# Patient Record
Sex: MALE | Race: WHITE | NOT HISPANIC OR LATINO | Employment: FULL TIME | URBAN - METROPOLITAN AREA
[De-identification: names, ages, dates, MRNs, and addresses within clinical notes are randomized per-mention and may not be internally consistent; named-entity substitution may affect disease eponyms.]

---

## 2017-07-25 ENCOUNTER — OFFICE VISIT (OUTPATIENT)
Dept: MEDICAL GROUP | Facility: MEDICAL CENTER | Age: 37
End: 2017-07-25
Payer: COMMERCIAL

## 2017-07-25 VITALS
BODY MASS INDEX: 43.4 KG/M2 | HEART RATE: 63 BPM | RESPIRATION RATE: 20 BRPM | OXYGEN SATURATION: 96 % | TEMPERATURE: 98.1 F | WEIGHT: 310 LBS | SYSTOLIC BLOOD PRESSURE: 128 MMHG | HEIGHT: 71 IN | DIASTOLIC BLOOD PRESSURE: 82 MMHG

## 2017-07-25 DIAGNOSIS — E66.01 MORBID OBESITY WITH BMI OF 40.0-44.9, ADULT (HCC): ICD-10-CM

## 2017-07-25 DIAGNOSIS — Z00.00 ENCOUNTER FOR ANNUAL PHYSICAL EXAM: ICD-10-CM

## 2017-07-25 PROCEDURE — 99395 PREV VISIT EST AGE 18-39: CPT | Performed by: PHYSICIAN ASSISTANT

## 2017-07-25 ASSESSMENT — PATIENT HEALTH QUESTIONNAIRE - PHQ9: CLINICAL INTERPRETATION OF PHQ2 SCORE: 0

## 2017-07-25 NOTE — PROGRESS NOTES
Moris Moffett is a 36 y.o. new male here for establishing care and annual exam.   HPI:    Patient is generally healthy without any questions or health concerns. He knows he has weight problems and his weight has been going up and down throughout the years. States he has been successful losing weight on his own without help off medication in the past. Admits to having about diet and not exercising.    Current medicines (including changes today)  No current outpatient prescriptions on file.     No current facility-administered medications for this visit.     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 He also has no past medical history of Congestive heart failure (CMS-HCC), Diabetes, Psychiatric disorder, Seizure disorder (CMS-HCC), Hypertension, ASTHMA, Cancer (CMS-HCC), Renal disorder, CAD (coronary artery disease), Liver disease, Stroke (CMS-HCC), or COPD.  He  has no past surgical history on file.  Social History   Substance Use Topics   • Smoking status: Never Smoker    • Smokeless tobacco: Never Used   • Alcohol Use: 0.0 oz/week     0 Standard drinks or equivalent per week      Comment: Very rarely     Social History     Social History Narrative     Family History   Problem Relation Age of Onset   • Other Mother      Obesity   • Other Father      Obesity   • Cancer Mother      Breast   • Cancer Maternal Grandmother      Breast   • Cancer Maternal Aunt      Breast   • Diabetes Paternal Grandmother    • Hypertension Mother      Family Status   Relation Status Death Age   • Mother Alive    • Father Alive        Past medical, surgical, family, and social history is reviewed in [facility] chart by me today.   Medications and allergies reviewed in Epic chart by me today.       ROS  General:  No fevers or chills, no  "night sweats or fatigue.   Eyes: no change in vision.   ENT:         Ears: No drainage. No change in hearing      Nose :No epitaxis        Mouth/ throat: No lesions. No sore throat  Resp: No difficulty breathing. No cough, wheezing.  CV: no SOB, no palpitation, no chest pain, no edema  GI: no nausea, no vomiting, no diarrhea or constipations. Bowel regular and normal. No melena or hematochezia. No hematemesis  : no Frequency, no urgency, no dysuria, no hematuria.   MS:  Negative for muscle pain or weakness.  Skin: no rashes or abnormal lesions.   Neuro: No seizures, no tingling or numbness.   Endo: no polyuria, no polydypsia, no cold intolerance, no heat intolerance  Psych: no depression, no anxiety, no Drug/Alcohol abuse  Hem: no easy bruising.    As documented in history of present illness  ROS neg:  All other review of systems were reviewed and negative.             Objective:     Blood pressure 128/82, pulse 63, temperature 36.7 °C (98.1 °F), resp. rate 20, height 1.803 m (5' 10.98\"), weight 140.615 kg (310 lb), SpO2 96 %. Body mass index is 43.26 kg/(m^2).  Physical Exam:    Constitutional: Well-developed and well-nourished. No distress.   Skin: Skin is warm and dry. No rashes in visible areas.  Nail: w/o pitting, ridging or onychomycosis   Head: Atraumatic without lesions.  Eyes: Equal, round and reactive, conjunctiva clear,sclera non icteric, lids normal.  Ears:  External ears unremarkable. Tympanic membranes clear and intact.  Nose: Nares patent. Septum midline. Turbinates without erythema nor edema. No discharge.    Mouth/Throat: Dentition is good. Tongue normal. Oropharynx is clear and moist. Posterior pharynx without erythema or exudates.  Neck: Supple, trachea midline.  No thyromegaly present. No lymphadenopathy--cervical or supraclavicular  Cardiovascular: Regular rate and rhythm, without any murmurs.  No lower extremity edema. Cap refill < 3sec  Lung:  Clear to auscultation throughout w/o any wheeze " or rhonchi. No adventitious sounds.    Abdomen: Soft, non tender, and without distention. Active bowel sounds in all four quadrants. No rebound, guarding, masses or HSM. No CVA tenderness  Musculoskeletal: All major joints without pain or swelling  Neurological: speech normal, mental status intact, gait grossly normal  Psychiatric:   Alert and oriented x3, normal affect and mood and behavior     Assessment and Plan:   The following treatment plan was discussed    1. Morbid obesity with BMI of 40.0-44.9, adult (CMS-[facility])  - Patient identified as having weight management issue.  Appropriate orders and counseling given.    2. Encounter for annual physical exam    - CBC WITH DIFFERENTIAL; Future  - COMP METABOLIC PANEL; Future  - LIPID PROFILE; Future  - TSH WITH REFLEX TO FT4; Future  - VITAMIN D,25 HYDROXY; Future      Followup: Return if symptoms worsen or fail to improve.         Please note that this dictation was created using voice recognition software. I have made every reasonable attempt to correct obvious errors, but I expect that there are errors of grammar and possibly content that I did not discover before finalizing the note

## 2017-07-26 ENCOUNTER — HOSPITAL ENCOUNTER (OUTPATIENT)
Dept: LAB | Facility: MEDICAL CENTER | Age: 37
End: 2017-07-26
Attending: PHYSICIAN ASSISTANT
Payer: COMMERCIAL

## 2017-07-26 DIAGNOSIS — Z00.00 ENCOUNTER FOR ANNUAL PHYSICAL EXAM: ICD-10-CM

## 2017-07-26 LAB
25(OH)D3 SERPL-MCNC: 14 NG/ML (ref 30–100)
BASOPHILS # BLD AUTO: 0.6 % (ref 0–1.8)
BASOPHILS # BLD: 0.04 K/UL (ref 0–0.12)
EOSINOPHIL # BLD AUTO: 0.11 K/UL (ref 0–0.51)
EOSINOPHIL NFR BLD: 1.5 % (ref 0–6.9)
ERYTHROCYTE [DISTWIDTH] IN BLOOD BY AUTOMATED COUNT: 40.5 FL (ref 35.9–50)
HCT VFR BLD AUTO: 45.8 % (ref 42–52)
HGB BLD-MCNC: 15.8 G/DL (ref 14–18)
IMM GRANULOCYTES # BLD AUTO: 0.02 K/UL (ref 0–0.11)
IMM GRANULOCYTES NFR BLD AUTO: 0.3 % (ref 0–0.9)
LYMPHOCYTES # BLD AUTO: 2.21 K/UL (ref 1–4.8)
LYMPHOCYTES NFR BLD: 30.7 % (ref 22–41)
MCH RBC QN AUTO: 30.5 PG (ref 27–33)
MCHC RBC AUTO-ENTMCNC: 34.5 G/DL (ref 33.7–35.3)
MCV RBC AUTO: 88.4 FL (ref 81.4–97.8)
MONOCYTES # BLD AUTO: 0.47 K/UL (ref 0–0.85)
MONOCYTES NFR BLD AUTO: 6.5 % (ref 0–13.4)
NEUTROPHILS # BLD AUTO: 4.35 K/UL (ref 1.82–7.42)
NEUTROPHILS NFR BLD: 60.4 % (ref 44–72)
NRBC # BLD AUTO: 0 K/UL
NRBC BLD AUTO-RTO: 0 /100 WBC
PLATELET # BLD AUTO: 249 K/UL (ref 164–446)
PMV BLD AUTO: 10.5 FL (ref 9–12.9)
RBC # BLD AUTO: 5.18 M/UL (ref 4.7–6.1)
TSH SERPL DL<=0.005 MIU/L-ACNC: 2.87 UIU/ML (ref 0.3–3.7)
WBC # BLD AUTO: 7.2 K/UL (ref 4.8–10.8)

## 2017-07-26 PROCEDURE — 85025 COMPLETE CBC W/AUTO DIFF WBC: CPT

## 2017-07-26 PROCEDURE — 36415 COLL VENOUS BLD VENIPUNCTURE: CPT

## 2017-07-26 PROCEDURE — 82306 VITAMIN D 25 HYDROXY: CPT

## 2017-07-26 PROCEDURE — 80053 COMPREHEN METABOLIC PANEL: CPT

## 2017-07-26 PROCEDURE — 84443 ASSAY THYROID STIM HORMONE: CPT

## 2017-07-26 PROCEDURE — 80061 LIPID PANEL: CPT

## 2017-07-27 LAB
ALBUMIN SERPL BCP-MCNC: 3.9 G/DL (ref 3.2–4.9)
ALBUMIN/GLOB SERPL: 1.3 G/DL
ALP SERPL-CCNC: 63 U/L (ref 30–99)
ALT SERPL-CCNC: 40 U/L (ref 2–50)
ANION GAP SERPL CALC-SCNC: 6 MMOL/L (ref 0–11.9)
AST SERPL-CCNC: 25 U/L (ref 12–45)
BILIRUB SERPL-MCNC: 0.7 MG/DL (ref 0.1–1.5)
BUN SERPL-MCNC: 13 MG/DL (ref 8–22)
CALCIUM SERPL-MCNC: 9.3 MG/DL (ref 8.5–10.5)
CHLORIDE SERPL-SCNC: 106 MMOL/L (ref 96–112)
CHOLEST SERPL-MCNC: 157 MG/DL (ref 100–199)
CO2 SERPL-SCNC: 26 MMOL/L (ref 20–33)
CREAT SERPL-MCNC: 0.86 MG/DL (ref 0.5–1.4)
GFR SERPL CREATININE-BSD FRML MDRD: >60 ML/MIN/1.73 M 2
GLOBULIN SER CALC-MCNC: 3 G/DL (ref 1.9–3.5)
GLUCOSE SERPL-MCNC: 78 MG/DL (ref 65–99)
HDLC SERPL-MCNC: 46 MG/DL
LDLC SERPL CALC-MCNC: 86 MG/DL
POTASSIUM SERPL-SCNC: 4.1 MMOL/L (ref 3.6–5.5)
PROT SERPL-MCNC: 6.9 G/DL (ref 6–8.2)
SODIUM SERPL-SCNC: 138 MMOL/L (ref 135–145)
TRIGL SERPL-MCNC: 126 MG/DL (ref 0–149)

## 2017-12-14 ENCOUNTER — OFFICE VISIT (OUTPATIENT)
Dept: MEDICAL GROUP | Facility: MEDICAL CENTER | Age: 37
End: 2017-12-14
Payer: COMMERCIAL

## 2017-12-14 VITALS
HEIGHT: 71 IN | RESPIRATION RATE: 16 BRPM | WEIGHT: 312.8 LBS | TEMPERATURE: 98.8 F | OXYGEN SATURATION: 93 % | DIASTOLIC BLOOD PRESSURE: 88 MMHG | HEART RATE: 94 BPM | SYSTOLIC BLOOD PRESSURE: 114 MMHG | BODY MASS INDEX: 43.79 KG/M2

## 2017-12-14 DIAGNOSIS — J06.9 ACUTE URI: ICD-10-CM

## 2017-12-14 LAB
FLUAV+FLUBV AG SPEC QL IA: NEGATIVE
INT CON NEG: NEGATIVE
INT CON POS: POSITIVE

## 2017-12-14 PROCEDURE — 87804 INFLUENZA ASSAY W/OPTIC: CPT | Performed by: PHYSICIAN ASSISTANT

## 2017-12-14 PROCEDURE — 99214 OFFICE O/P EST MOD 30 MIN: CPT | Performed by: PHYSICIAN ASSISTANT

## 2017-12-14 RX ORDER — IBUPROFEN 200 MG
200 TABLET ORAL EVERY 6 HOURS PRN
COMMUNITY
End: 2018-06-20

## 2017-12-14 RX ORDER — ACETAMINOPHEN 325 MG/1
650 TABLET ORAL EVERY 4 HOURS PRN
COMMUNITY
End: 2018-06-20

## 2017-12-14 RX ORDER — PROMETHAZINE HYDROCHLORIDE AND CODEINE PHOSPHATE 6.25; 1 MG/5ML; MG/5ML
5 SYRUP ORAL
Qty: 160 ML | Refills: 0 | Status: SHIPPED | OUTPATIENT
Start: 2017-12-14 | End: 2018-01-03

## 2017-12-14 NOTE — PROGRESS NOTES
Subjective:   Moris Moffett is a 37 y.o. male here today for possible influenza since Saturday which is 5 days.    Acute URI  This is a 37-year-old male accompanied by his wife. Since Saturday which is approximately 5 days he's had fevers with sinus congestion drainage and coughing. Last fever was last night at 101. He's been taking ibuprofen 600 mg as needed. Taking some cough syrup. It's an over-the-counter product. Last week returned from [place] on a trip. No known sick contacts at home. He denies any current chest pain or shortness of breath. Coughing was a problem last night. He has no comorbidities. Takes no chronic medications. Has 2 young boys at home and they are concerned about possible influenza.       Current medicines (including changes today)  Current Outpatient Prescriptions   Medication Sig Dispense Refill   • acetaminophen (TYLENOL) 325 MG Tab Take 650 mg by mouth every four hours as needed.     • promethazine-codeine (PHENERGAN-CODEINE) 6.25-10 MG/5ML Syrup Take 5 mL by mouth every bedtime for 20 days. 160 mL 0   • ibuprofen (MOTRIN) 200 MG Tab Take 200 mg by mouth every 6 hours as needed.       No current facility-administered medications for this visit.      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 He also has no past medical history of ASTHMA; CAD (coronary artery disease); Cancer (CMS-[facility]); Congestive heart failure (CMS-[facility]); COPD; Diabetes; Hypertension; Liver disease; Psychiatric disorder; Renal disorder; Seizure disorder (CMS-[facility]); or Stroke (CMS-[facility]).    ROS   No chest pain, no shortness of breath, no abdominal pain and all other systems were reviewed and are negative.       Objective:     Blood pressure 114/88, pulse 94, temperature 37.1 °C (98.8 °F), resp. rate 16, height 1.803  "m (5' 10.98\"), weight (!) 141.9 kg (312 lb 12.8 oz), SpO2 93 %. Body mass index is 43.65 kg/m².   Physical Exam:  Constitutional: Alert, no distress.  Skin: Warm, dry, good turgor, no rashes in visible areas.  Eye: Equal, round and reactive, conjunctiva clear, lids normal.  ENMT: Lips without lesions, good dentition, oropharynx clear.  Neck: Trachea midline, no masses.   Lymph: No cervical or supraclavicular lymphadenopathy  Respiratory: Unlabored respiratory effort, lungs clear to auscultation, no wheezes, no ronchi.  Cardiovascular: Normal S1, S2, no murmur, no edema.  Abdomen: Soft, non-tender, no masses.  Psych: Alert and oriented x3, normal affect and mood.    Influenza A and B rapid testing was negative.    Assessment and Plan:   The following treatment plan was discussed    1. Acute URI  Acute, new onset condition. Discussed with viral not bacterial cause. No fever today. We'll continue take ibuprofen 600 mg as needed for fevers. May take it every 6 hours. May alternate with Tylenol. Push fluids. Advised may continue over-the-counter cold medications such as DayQuil or NyQuil severe. Follow-up if any worsening symptoms such as fevers, shortness of breath or chest pain. Also advised I provided promethazine codeine to take at nighttime if needed. Do not take with other cold medications. May cause drowsiness. Do not drink or drive. May take it one hour prior to bedtime.  - POCT Influenza A/B  - promethazine-codeine (PHENERGAN-CODEINE) 6.25-10 MG/5ML Syrup; Take 5 mL by mouth every bedtime for 20 days.  Dispense: 160 mL; Refill: 0      Followup: No Follow-up on file.    Please note that this dictation was created using voice recognition software. I have made every reasonable attempt to correct obvious errors, but I expect that there are errors of grammar and possibly content that I did not discover before finalizing the note.           "

## 2017-12-14 NOTE — ASSESSMENT & PLAN NOTE
This is a 37-year-old male accompanied by his wife. Since Saturday which is approximately 5 days he's had fevers with sinus congestion drainage and coughing. Last fever was last night at 101. He's been taking ibuprofen 600 mg as needed. Taking some cough syrup. It's an over-the-counter product. Last week returned from [place] on a trip. No known sick contacts at home. He denies any current chest pain or shortness of breath. Coughing was a problem last night. He has no comorbidities. Takes no chronic medications. Has 2 young boys at home and they are concerned about possible influenza.

## 2017-12-14 NOTE — LETTER
December 14, 2017         Patient: Moris Moffett   YOB: 1980   Date of Visit: 12/14/2017           To Whom it May Concern:    Moris Moffett was seen in my clinic on 12/14/2017. He may return to work on 12/15/17.    If you have any questions or concerns, please don't hesitate to call.        Sincerely,           Tyson Marsh P.A.-C.  Electronically Signed

## 2018-06-20 ENCOUNTER — OFFICE VISIT (OUTPATIENT)
Dept: MEDICAL GROUP | Facility: MEDICAL CENTER | Age: 38
End: 2018-06-20
Payer: COMMERCIAL

## 2018-06-20 ENCOUNTER — HOSPITAL ENCOUNTER (OUTPATIENT)
Dept: RADIOLOGY | Facility: MEDICAL CENTER | Age: 38
End: 2018-06-20
Attending: FAMILY MEDICINE
Payer: COMMERCIAL

## 2018-06-20 ENCOUNTER — HOSPITAL ENCOUNTER (OUTPATIENT)
Dept: LAB | Facility: MEDICAL CENTER | Age: 38
End: 2018-06-20
Attending: FAMILY MEDICINE
Payer: COMMERCIAL

## 2018-06-20 VITALS
RESPIRATION RATE: 20 BRPM | TEMPERATURE: 98.2 F | HEART RATE: 58 BPM | WEIGHT: 300 LBS | SYSTOLIC BLOOD PRESSURE: 132 MMHG | OXYGEN SATURATION: 94 % | BODY MASS INDEX: 42 KG/M2 | DIASTOLIC BLOOD PRESSURE: 86 MMHG | HEIGHT: 71 IN

## 2018-06-20 DIAGNOSIS — R20.2 NUMBNESS AND TINGLING IN LEFT ARM: ICD-10-CM

## 2018-06-20 DIAGNOSIS — R20.0 NUMBNESS OF FOOT: ICD-10-CM

## 2018-06-20 DIAGNOSIS — R05.9 COUGH: ICD-10-CM

## 2018-06-20 DIAGNOSIS — R10.11 RIGHT UPPER QUADRANT PAIN: ICD-10-CM

## 2018-06-20 DIAGNOSIS — M54.2 NECK DISCOMFORT: ICD-10-CM

## 2018-06-20 DIAGNOSIS — R20.0 NUMBNESS AND TINGLING IN LEFT ARM: ICD-10-CM

## 2018-06-20 DIAGNOSIS — E66.01 MORBID OBESITY WITH BMI OF 40.0-44.9, ADULT (HCC): ICD-10-CM

## 2018-06-20 DIAGNOSIS — R19.5 CHANGE IN STOOL: ICD-10-CM

## 2018-06-20 DIAGNOSIS — M54.50 ACUTE RIGHT-SIDED LOW BACK PAIN WITHOUT SCIATICA: ICD-10-CM

## 2018-06-20 DIAGNOSIS — R07.89 OTHER CHEST PAIN: ICD-10-CM

## 2018-06-20 PROBLEM — Z00.00 HEALTHCARE MAINTENANCE: Status: RESOLVED | Noted: 2018-06-20 | Resolved: 2018-06-20

## 2018-06-20 PROBLEM — J06.9 ACUTE URI: Status: RESOLVED | Noted: 2017-12-14 | Resolved: 2018-06-20

## 2018-06-20 PROBLEM — Z00.00 HEALTHCARE MAINTENANCE: Status: ACTIVE | Noted: 2018-06-20

## 2018-06-20 LAB
ALBUMIN SERPL BCP-MCNC: 4.3 G/DL (ref 3.2–4.9)
ALBUMIN/GLOB SERPL: 1.7 G/DL
ALP SERPL-CCNC: 57 U/L (ref 30–99)
ALT SERPL-CCNC: 43 U/L (ref 2–50)
ANION GAP SERPL CALC-SCNC: 8 MMOL/L (ref 0–11.9)
APPEARANCE UR: CLEAR
AST SERPL-CCNC: 26 U/L (ref 12–45)
BILIRUB SERPL-MCNC: 0.7 MG/DL (ref 0.1–1.5)
BILIRUB UR QL STRIP.AUTO: NEGATIVE
BUN SERPL-MCNC: 13 MG/DL (ref 8–22)
CALCIUM SERPL-MCNC: 9.2 MG/DL (ref 8.5–10.5)
CHLORIDE SERPL-SCNC: 106 MMOL/L (ref 96–112)
CO2 SERPL-SCNC: 23 MMOL/L (ref 20–33)
COLOR UR: YELLOW
CREAT SERPL-MCNC: 0.95 MG/DL (ref 0.5–1.4)
GLOBULIN SER CALC-MCNC: 2.5 G/DL (ref 1.9–3.5)
GLUCOSE SERPL-MCNC: 87 MG/DL (ref 65–99)
GLUCOSE UR STRIP.AUTO-MCNC: NEGATIVE MG/DL
KETONES UR STRIP.AUTO-MCNC: NEGATIVE MG/DL
LEUKOCYTE ESTERASE UR QL STRIP.AUTO: NEGATIVE
LIPASE SERPL-CCNC: 20 U/L (ref 11–82)
MICRO URNS: NORMAL
NITRITE UR QL STRIP.AUTO: NEGATIVE
PH UR STRIP.AUTO: 6 [PH]
POTASSIUM SERPL-SCNC: 4.4 MMOL/L (ref 3.6–5.5)
PROT SERPL-MCNC: 6.8 G/DL (ref 6–8.2)
PROT UR QL STRIP: NEGATIVE MG/DL
RBC UR QL AUTO: NEGATIVE
SODIUM SERPL-SCNC: 137 MMOL/L (ref 135–145)
SP GR UR STRIP.AUTO: 1.01
UROBILINOGEN UR STRIP.AUTO-MCNC: 0.2 MG/DL

## 2018-06-20 PROCEDURE — 71046 X-RAY EXAM CHEST 2 VIEWS: CPT

## 2018-06-20 PROCEDURE — 83690 ASSAY OF LIPASE: CPT

## 2018-06-20 PROCEDURE — 93000 ELECTROCARDIOGRAM COMPLETE: CPT | Performed by: FAMILY MEDICINE

## 2018-06-20 PROCEDURE — 99215 OFFICE O/P EST HI 40 MIN: CPT | Performed by: FAMILY MEDICINE

## 2018-06-20 PROCEDURE — 81003 URINALYSIS AUTO W/O SCOPE: CPT

## 2018-06-20 PROCEDURE — 80053 COMPREHEN METABOLIC PANEL: CPT

## 2018-06-20 PROCEDURE — 36415 COLL VENOUS BLD VENIPUNCTURE: CPT

## 2018-06-20 NOTE — ASSESSMENT & PLAN NOTE
The patient has noticed over the past week some mild severity, intermittent, left arm tingling with possible weakness that usually occurs after certain movements like holding his i-pad tablet and sometimes with exertion. He wonders if it is more noticeable after walking outside in hot weather. He is currently not experiencing these symptoms. He denies cervical pain.

## 2018-06-20 NOTE — ASSESSMENT & PLAN NOTE
"The patient describes an approximately 4 week h/o intermittent, poorly-localized right lateral neck discomfort. He states it is not really a pain but more of a \"feeling\". There is no known trauma but he did recently start working out. There are no sharp, shooting pains associated with the neck discomfort. There are no know alleviating or exacerbating factors. Of note, the patient had a reassuring CT scan of the neck in 2010 for similar complaints.  "

## 2018-06-20 NOTE — ASSESSMENT & PLAN NOTE
Patient states that after about 25 minutes of using the elliptical, his feet will get numb. When he gets off the machine this issue completely resolves. He also states that over the past few weeks, sometimes he will get an aching in his right foot that is intermittent and mild in nature. He is not currently experiencing any of these problems.

## 2018-06-20 NOTE — ASSESSMENT & PLAN NOTE
Patient describes an approximately one-year history of chest discomfort that is usually only felt when he draws his arms back. There is no known trauma. There is no exertional chest pain or dyspnea. There is no associated diaphoresis or nausea.

## 2018-06-20 NOTE — PROGRESS NOTES
"[facility]  Progress Note  Established Patient    Subjective:   Moris Moffett is a 37 y.o. male here today with a chief complaint of cough. The patient is alone.     Cough  Patient states that in December he got the flu that he ultimately recovered from. Since February, he has noticed a dry cough. It is intermittent and worse in the mornings. Sometimes he feels like he has a lump in his throat. He did try a two-week course of Prilosec without benefit. Patient does not have allergies that he is aware of. He is a nonsmoker.    Neck discomfort  The patient describes an approximately 4 week h/o intermittent, poorly-localized right lateral neck discomfort. He states it is not really a pain but more of a \"feeling\". There is no known trauma but he did recently start working out. There are no sharp, shooting pains associated with the neck discomfort. There are no know alleviating or exacerbating factors. Of note, the patient had a reassuring CT scan of the neck in 2010 for similar complaints.    Healthcare maintenance  Lipids:   Fasting Glucose:   Hepatitis C Screen:   Colonoscopy:     Pneumonia vaccine:   Tdap:   Shingrix vaccine:     Other chest pain  Patient describes an approximately one-year history of chest discomfort that is usually only felt when he draws his arms back. There is no known trauma. There is no exertional chest pain or dyspnea. There is no associated diaphoresis or nausea.    Right upper quadrant pain  The patient describes a several week history of right upper quadrant/ right costal margin pain. The patient does not know whether or not eating makes these pains worse. He denies associated nausea, vomiting or diarrhea. He states he can feel the pain more with certain movements.    Acute right-sided low back pain without sciatica  For the past few weeks the patient has noticed some right sided lower back pain. This tends to be more noticeable with certain movements. He denies bowel or " bladder incontinence or retention. He denies perineal anesthesia. He has not tried anti-inflammatories. There is no dysuria or fever. There is no trauma, however, the patient has recently started working out more. The pain is quite mild in severity.     Change in stool  Patient states that his bowels are very regular, however, when he does have firm stools, they are ribbonlike in shape. He is concerned about the possibility of colon cancer. In conjunction with this, the patient also describes some right lower quadrant abdominal discomfort that is intermittent and mild in severity. He states he had a similar issue in 2011 or 2012. At that time, he saw Dr. Hicks (GI consultant) suspected IBS. Dr. Hicks did not think endoscopy was appropriate at that time but did do a serologic workup including celiac testing, TSH, CRP and ESR. These were normal.    Numbness and tingling in left arm  The patient has noticed over the past week some mild severity, intermittent, left arm tingling with possible weakness that usually occurs after certain movements like holding his i-pad tablet and sometimes with exertion. He wonders if it is more noticeable after walking outside in hot weather. He is currently not experiencing these symptoms. He denies cervical pain.     Numbness of foot  Patient states that after about 25 minutes of using the elliptical, his feet will get numb. When he gets off the machine this issue completely resolves. He also states that over the past few weeks, sometimes he will get an aching in his right foot that is intermittent and mild in nature. He is not currently experiencing any of these problems.       No current outpatient prescriptions on file prior to visit.     No current facility-administered medications on file prior to visit.        Past Medical History:   Diagnosis Date   • Obesity (BMI 35.0-39.9 without comorbidity)        Allergies: Amoxicillin    Surgical History:  has no past surgical  "history on file.    Family History: family history includes Cancer in his maternal aunt, maternal grandmother, and mother; Diabetes in his paternal grandmother; Heart Disease in his father; Hypertension in his mother.    Social History:  reports that he has never smoked. He has never used smokeless tobacco. He reports that he drinks alcohol. He reports that he does not use drugs.    ROS: see HPI.        Objective:     Vitals:    06/20/18 0821   BP: 132/86   Pulse: (!) 58   Resp: 20   Temp: 36.8 °C (98.2 °F)   SpO2: 94%   Weight: (!) 136.1 kg (300 lb)   Height: 1.803 m (5' 10.98\")       Physical Exam:  General: alert in no apparent distress.   Cardio: regular rate and rhythm, no murmurs, rubs or gallops. No reproducible tenderness to palpation. 2+ radial pulses bilaterally, 2+ DP pulses bilaterally.   Resp: CTAB no w/r/r.  ENMT: hypertrophic nasal turbinates bilaterally, pharyngeal cobblestoning identified. No TTP or woody induration to floor of mouth.   Neck: no TTP over cervical spine, negative Spurling's bilaterally. No trapezius tenderness or spasm. No torticollis.   Lymph: no cervical or supraclavicular LAD. No BLE edema.   GI: soft, NTND, no rebound, no guarding, negative Herr's sign.   MSK: no TTP over ribs, including R costal margin. Negative Kane's bilaterally. 5/5 strength on handgrip bilaterally.   Back: no CVAT. No TTP over spine or hips. Negative straight leg raise bilaterally, negative RICARDO bilaterally. Able to stand on toes and heels without a problem.   Neuro: sensation intact in all dermatomal distrubutions of BLE. Sensation intact in hands bilaterally.   Gait: normal.     EKG: rate 48, rhythm sinus, intervals normal, axis normal. Isolated TWI in III.     Assessment and Plan:     1. Cough  History and exam is most consistent with postnasal drip. The patient has not responded to Prilosec. I think we should do a 6 week trial of treatment below and reassess. We will also obtain a chest x-ray " considering this complaint, as well as others.   - NetiPot, Flonase and humidifier. Counseled on appropriate use.   - DX-CHEST-2 VIEWS; Future    2. Neck discomfort  Patient describes right lateral neck discomfort. This appears to be very mild in severity and intermittent in nature. The exam is normal. I suspect this is a very slight muscle strain, possibly the sternocleidomastoid. No evidence of torticollis.   - stretching and ROM exercises.   - re-assess in 6 weeks.     3. Numbness of foot  Patient describes numbness of the feet with some aching in the right foot from time to time. Again, his symptoms are intermittent and appear to be very mild in severity. His neurovascular exam is completely normal. He is not currently having symptoms. Considering his clinical presentation, I think that we can watch and wait. I don't see any indication for imaging or nerve conduction studies at the current time, however, this is something we could consider if the patient were to have persistent or progressive symptoms.    4. Change in stool  Patient describes a change in the caliber of his stool with right lower quadrant pain that was present several years ago, as well as right upper quadrant abdominal pain. The patient was assessed by GI in 2011. He is interested in seeing them again for further evaluation and to consider the need to colon cancer assessment. I did offer him a FIT test today but he would like to see GI to consider risks/benefits of colonoscopy.   - REFERRAL TO GASTROENTEROLOGY    5. Right upper quadrant pain  See above.   - COMP METABOLIC PANEL; Future  - LIPASE; Future  - REFERRAL TO GASTROENTEROLOGY    6. Acute right-sided low back pain without sciatica  Patient's exam is normal and he has no red flags. He recently started exercising again and he is obese. Suspect that the right lower back pain is due to strain. I recommended stretching and supportive care and we will re-assess in 6 weeks. If it persists,  imaging may be in order. Will also proceed with UA.   - stretching.   - URINALYSIS,CULTURE IF INDICATED; Future    7. Other chest pain  The patient's description of the pain certainly sounds very mild in severity and intermittent in nature. It sounds musculoskeletal in quality. I reassured him that this is likely a musculoskeletal etiology. I suspect his obesity is contributing to the pain. As a precaution, an EKG was done and was reassuring.  - CXR.   - counseled on weight loss, may try rare NSAID's for pain relief. The patient assures me the pain is not too bothersome.     8. Morbid obesity with BMI of 40.0-44.9, adult (HCC)  - I recommended weight loss and discussed diet and exercise.   - I offered the health improvement program. The patient declined this.    9. Numbness and tingling in left arm  Patient's neurovascular exam is normal and his symptoms appear to be very mild in severity and intermittent in nature. I suspect that this is purely an anatomic phenomenon, as we all experience intermittent numbness or tingling due to positional effects. Considering the clinical character, I don't think further evaluation is currently warranted, however, if the patient's symptoms persist or progress, EMG or imaging may be in order.    Note: the patient was advised to call with any new or worsening symptoms.     Total 49 minutes face-to-face time spent with patient, with greater than 50% of the total time discussing patient's issues and symptoms as listed above in assessment and plan, as well as managing coordination of care for future evaluation and treatment.    Followup: Return in about 6 weeks (around 8/1/2018), or if symptoms worsen or fail to improve.

## 2018-06-20 NOTE — ASSESSMENT & PLAN NOTE
Lipids:   Fasting Glucose:   Hepatitis C Screen:   Colonoscopy:     Pneumonia vaccine:   Tdap:   Shingrix vaccine:

## 2018-06-20 NOTE — ASSESSMENT & PLAN NOTE
For the past few weeks the patient has noticed some right sided lower back pain. This tends to be more noticeable with certain movements. He denies bowel or bladder incontinence or retention. He denies perineal anesthesia. He has not tried anti-inflammatories. There is no dysuria or fever. There is no trauma, however, the patient has recently started working out more. The pain is quite mild in severity.

## 2018-06-20 NOTE — ASSESSMENT & PLAN NOTE
Patient states that in December he got the flu that he ultimately recovered from. Since February, he has noticed a dry cough. It is intermittent and worse in the mornings. Sometimes he feels like he has a lump in his throat. He did try a two-week course of Prilosec without benefit. Patient does not have allergies that he is aware of. He is a nonsmoker.

## 2018-06-20 NOTE — ASSESSMENT & PLAN NOTE
The patient describes a several week history of right upper quadrant/ right costal margin pain. The patient does not know whether or not eating makes these pains worse. He denies associated nausea, vomiting or diarrhea. He states he can feel the pain more with certain movements.

## 2018-06-20 NOTE — ASSESSMENT & PLAN NOTE
Patient states that his bowels are very regular, however, when he does have firm stools, they are ribbonlike in shape. He is concerned about the possibility of colon cancer. In conjunction with this, the patient also describes some right lower quadrant abdominal discomfort that is intermittent and mild in severity. He states he had a similar issue in 2011 or 2012. At that time, he saw Dr. Hicks (GI consultant) suspected IBS. Dr. Hicks did not think endoscopy was appropriate at that time but did do a serologic workup including celiac testing, TSH, CRP and ESR. These were normal.

## 2018-08-02 ENCOUNTER — OFFICE VISIT (OUTPATIENT)
Dept: MEDICAL GROUP | Facility: MEDICAL CENTER | Age: 38
End: 2018-08-02
Payer: COMMERCIAL

## 2018-08-02 VITALS
DIASTOLIC BLOOD PRESSURE: 78 MMHG | RESPIRATION RATE: 20 BRPM | WEIGHT: 289.24 LBS | OXYGEN SATURATION: 97 % | HEIGHT: 71 IN | SYSTOLIC BLOOD PRESSURE: 128 MMHG | TEMPERATURE: 97.1 F | BODY MASS INDEX: 40.49 KG/M2 | HEART RATE: 54 BPM

## 2018-08-02 DIAGNOSIS — R20.2 NUMBNESS AND TINGLING IN LEFT ARM: ICD-10-CM

## 2018-08-02 DIAGNOSIS — R20.0 NUMBNESS OF FOOT: ICD-10-CM

## 2018-08-02 DIAGNOSIS — M54.2 NECK DISCOMFORT: ICD-10-CM

## 2018-08-02 DIAGNOSIS — R20.0 NUMBNESS AND TINGLING IN LEFT ARM: ICD-10-CM

## 2018-08-02 DIAGNOSIS — E66.01 MORBID OBESITY WITH BMI OF 40.0-44.9, ADULT (HCC): ICD-10-CM

## 2018-08-02 DIAGNOSIS — R05.9 COUGH: ICD-10-CM

## 2018-08-02 DIAGNOSIS — R07.89 OTHER CHEST PAIN: ICD-10-CM

## 2018-08-02 DIAGNOSIS — R10.11 RIGHT UPPER QUADRANT PAIN: ICD-10-CM

## 2018-08-02 DIAGNOSIS — R19.5 CHANGE IN STOOL: ICD-10-CM

## 2018-08-02 DIAGNOSIS — M54.50 ACUTE RIGHT-SIDED LOW BACK PAIN WITHOUT SCIATICA: ICD-10-CM

## 2018-08-02 PROCEDURE — 99214 OFFICE O/P EST MOD 30 MIN: CPT | Performed by: FAMILY MEDICINE

## 2018-08-02 RX ORDER — NAPROXEN 375 MG/1
375 TABLET ORAL 2 TIMES DAILY WITH MEALS
Qty: 10 TAB | Refills: 0 | Status: SHIPPED | OUTPATIENT
Start: 2018-08-02 | End: 2018-08-07

## 2018-08-02 ASSESSMENT — PATIENT HEALTH QUESTIONNAIRE - PHQ9: CLINICAL INTERPRETATION OF PHQ2 SCORE: 0

## 2018-08-02 NOTE — ASSESSMENT & PLAN NOTE
At the last visit the patient described a one-year history of chest discomfort that is usually only felt when he draws his arms back. There is no known trauma. There is no exertional chest pain or dyspnea. There is no associated diaphoresis or nausea. An EKG and chest x-ray are reassuring. The patient states his symptoms have significantly improved.

## 2018-08-02 NOTE — ASSESSMENT & PLAN NOTE
The patient is actively working on weight loss with diet and exercise. He is not currently interested in seeing the health improvement program or trying medication.

## 2018-08-02 NOTE — ASSESSMENT & PLAN NOTE
The patient has an upcoming appointment with GI for this issue. He also describes to me some sensations of pulsations in his anus.

## 2018-08-02 NOTE — ASSESSMENT & PLAN NOTE
At the last visit the patient described some left arm tingling with possible weakness. This issue has completely resolved.

## 2018-08-02 NOTE — ASSESSMENT & PLAN NOTE
At the last visit the patient described right lateral neck discomfort. With a little stretching and time, this has completely resolved.

## 2018-08-02 NOTE — PROGRESS NOTES
[facility]  Progress Note  Established Patient    Subjective:   Moris Moffett is a 37 y.o. male here today with a chief complaint of back pain. The patient is alone.     Acute right-sided low back pain without sciatica  At the last visit the patient described a few weeks of right sided lower back pain. Without red flags or trauma, preceded by a recent increase in activity. The patient continues to have this pain. CMP and UA is normal. He continues to work out and is working on weight loss. He has not tried any anti-inflammatory medicine.     Change in stool  The patient has an upcoming appointment with GI for this issue. He also describes to me some sensations of pulsations in his anus.    Cough  At the last visit the patient described an intermittent dry cough worse in the mornings with a globus sensation nonresponsive to a short course of low-dose Prilosec. I recommended a humidifier, Neti pot and Flonase at the last visit. He did not use the Neti pot or humidifier, however, he did use the Flonase for 3 weeks without an appreciable difference.    Morbid obesity with BMI of 40.0-44.9, adult  The patient is actively working on weight loss with diet and exercise. He is not currently interested in seeing the health improvement program or trying medication.    Neck discomfort  At the last visit the patient described right lateral neck discomfort. With a little stretching and time, this has completely resolved.    Numbness and tingling in left arm  At the last visit the patient described some left arm tingling with possible weakness. This issue has completely resolved.    Numbness of foot  At the last visit the patient described post-exercise foot numbness. When he gets off the machine this issue completely resolves. Symptoms remain very intermittent and mild.    Other chest pain  At the last visit the patient described a one-year history of chest discomfort that is usually only felt when he draws his  "arms back. There is no known trauma. There is no exertional chest pain or dyspnea. There is no associated diaphoresis or nausea. An EKG and chest x-ray are reassuring. The patient states his symptoms have significantly improved.    Right upper quadrant pain  The patient will be seeing GI for this issue soon.      No current outpatient prescriptions on file prior to visit.     No current facility-administered medications on file prior to visit.        Past Medical History:   Diagnosis Date   • Obesity (BMI 35.0-39.9 without comorbidity)        Allergies: Amoxicillin    Surgical History:  has no past surgical history on file.    Family History: family history includes Cancer in his maternal aunt, maternal grandmother, and mother; Diabetes in his paternal grandmother; Heart Disease in his father; Hypertension in his mother.    Social History:  reports that he has never smoked. He has never used smokeless tobacco. He reports that he drinks alcohol. He reports that he does not use drugs.    ROS: no lightheadedness, dizziness, syncope, fever.        Objective:     Vitals:    08/02/18 0928   BP: 128/78   Pulse: (!) 54   Resp: 20   Temp: 36.2 °C (97.1 °F)   SpO2: 97%   Weight: (!) 131.2 kg (289 lb 3.9 oz)   Height: 1.803 m (5' 10.98\")       Physical Exam:  General: alert in no apparent distress.   Gait: normal.   Rectal: no obvious hemorrhoids or vascular abnormalities around the anus.  ENMT: Hyperemic and hypertrophic nasal turbinates bilaterally with pharyngeal cobblestoning.        Assessment and Plan:     1. Acute right-sided low back pain without sciatica  I suspect this is due to his recent increase in activity. I offered the patient an x-ray or watchful waiting. He would like to give it another 6 weeks of stretching. We can also try naproxen. If it persists, we will proceed with lumbar spine and hip x-ray.  - naproxen (NAPROSYN) 375 MG Tab; Take 1 Tab by mouth 2 times a day, with meals for 5 days.  Dispense: 10 Tab; " Refill: 0    2. Other chest pain  Much improved, most consistent with a musculoskeletal etiology. We'll try some naproxen and stretching.  - naproxen (NAPROSYN) 375 MG Tab; Take 1 Tab by mouth 2 times a day, with meals for 5 days.  Dispense: 10 Tab; Refill: 0    3. Change in stool  - f/u GI.     4. Cough  This remains most consistent with postnasal drip. I discussed with the patient the risk of malignancy, however, this seems highly unlikely considering he is a low risk patient. I recommended that we try aggressive treatment for postnasal drip and if no resolution in 6 weeks, the patient will let me know. At that point we will try aggressive treatment with Prilosec. If no resolution in 6 weeks, we will proceed with an ENT and GI referral for laryngoscopy and EGD.  - humidifier, neti pot, flonase and allegra.     5. Morbid obesity with BMI of 40.0-44.9, adult (HCC)  - continue diet and exercise.   - commended patient on weight loss.     6. Neck discomfort  - resolved.     7. Numbness and tingling in left arm  - resolved.     8. Numbness of foot  Likely physiologic.   - supportive care.   - return with new/worsening x.     9. Right upper quadrant pain  - f/u GI.         Followup: Return in about 3 months (around 11/2/2018), or if symptoms worsen or fail to improve.

## 2018-08-02 NOTE — ASSESSMENT & PLAN NOTE
At the last visit the patient described post-exercise foot numbness. When he gets off the machine this issue completely resolves. Symptoms remain very intermittent and mild.

## 2018-08-02 NOTE — ASSESSMENT & PLAN NOTE
At the last visit the patient described a few weeks of right sided lower back pain. Without red flags or trauma, preceded by a recent increase in activity. The patient continues to have this pain. CMP and UA is normal. He continues to work out and is working on weight loss. He has not tried any anti-inflammatory medicine.

## 2018-09-24 ENCOUNTER — PATIENT MESSAGE (OUTPATIENT)
Dept: MEDICAL GROUP | Facility: MEDICAL CENTER | Age: 38
End: 2018-09-24

## 2018-09-24 NOTE — TELEPHONE ENCOUNTER
----- Message from Moris Moffett sent at 9/24/2018  2:43 PM PDT -----  Regarding: Non-Urgent Medical Question  Contact: [phone]  Yaw العلي,  I've been having some numbness in my feet and some prickling sensations in my hands over the last week or so.  Today, I noticed that most of the morning, the prickling sensation would hit in spots all over my body, though it has subsided at the moment.  I have an appointment scheduled for Nov. 2, but was wondering if you think meeting sooner would be appropriate?    Thanks,  Georgi

## 2018-09-24 NOTE — PATIENT COMMUNICATION
Called Pt. Due to his MyChart message and I scheduled him to see Dr. Soares Next week. I also advised him if his symptoms worsen to go ahead and go to Urgent Care.

## 2018-10-02 ENCOUNTER — OFFICE VISIT (OUTPATIENT)
Dept: MEDICAL GROUP | Facility: MEDICAL CENTER | Age: 38
End: 2018-10-02
Payer: COMMERCIAL

## 2018-10-02 VITALS
HEIGHT: 71 IN | TEMPERATURE: 97.5 F | HEART RATE: 78 BPM | DIASTOLIC BLOOD PRESSURE: 80 MMHG | RESPIRATION RATE: 20 BRPM | OXYGEN SATURATION: 97 % | SYSTOLIC BLOOD PRESSURE: 128 MMHG | BODY MASS INDEX: 39.81 KG/M2 | WEIGHT: 284.39 LBS

## 2018-10-02 DIAGNOSIS — R20.0 NUMBNESS OF FOOT: ICD-10-CM

## 2018-10-02 DIAGNOSIS — R05.9 COUGH: ICD-10-CM

## 2018-10-02 DIAGNOSIS — Z13.6 SCREENING FOR ISCHEMIC HEART DISEASE: ICD-10-CM

## 2018-10-02 DIAGNOSIS — M54.50 ACUTE RIGHT-SIDED LOW BACK PAIN WITHOUT SCIATICA: ICD-10-CM

## 2018-10-02 DIAGNOSIS — R20.2 PARESTHESIAS: ICD-10-CM

## 2018-10-02 DIAGNOSIS — R07.89 OTHER CHEST PAIN: ICD-10-CM

## 2018-10-02 PROCEDURE — 99214 OFFICE O/P EST MOD 30 MIN: CPT | Performed by: FAMILY MEDICINE

## 2018-10-02 RX ORDER — M-VIT,TX,IRON,MINS/CALC/FOLIC 27MG-0.4MG
1 TABLET ORAL DAILY
COMMUNITY
End: 2019-06-25

## 2018-10-02 NOTE — ASSESSMENT & PLAN NOTE
"The patient describes intermittent numbness in both feet, more consistent in the right lateral 3 digits of his foot. He also states that last Monday he felt disseminated \"pinpricks\" that lasted a few hours. They were initially strong but severity waned. He states they now come and go. He has had some recent healthcare related anxiety, however, he thinks he is now doing well. He denies back pain.   "

## 2018-10-02 NOTE — PROGRESS NOTES
"[facility]  Progress Note  Established Patient    Subjective:   Moris Moffett is a 38 y.o. male here today with a chief complaint of pinpricks. The patient is alone.     Paresthesias  The patient describes intermittent numbness in both feet, more consistent in the right lateral 3 digits of his foot. He also states that last Monday he felt disseminated \"pinpricks\" that lasted a few hours. They were initially strong but severity waned. He states they now come and go. He has had some recent healthcare related anxiety, however, he thinks he is now doing well. He denies back pain.     Cough  I have been treating the patient with Flonase, Allegra, Neti pot and humidifier for an intermittent dry cough worse in the mornings with a globus sensation nonresponsive to a short course of low-dose Prilosec. Patient states his symptoms have improved, however, sometimes he will get an occasional cough. The chest x-ray was normal.    Acute right-sided low back pain without sciatica  This issue has resolved.    Other chest pain  This issue has resolved.    Numbness of foot  Please see discussion regarding \"paresthesias\".      No current outpatient prescriptions on file prior to visit.     No current facility-administered medications on file prior to visit.        Past Medical History:   Diagnosis Date   • Obesity (BMI 35.0-39.9 without comorbidity)        Allergies: Amoxicillin    Surgical History:  has no past surgical history on file.    Family History: family history includes Cancer in his maternal aunt, maternal grandmother, and mother; Diabetes in his paternal grandmother; Heart Disease in his father; Hypertension in his mother.    Social History:  reports that he has never smoked. He has never used smokeless tobacco. He reports that he drinks alcohol. He reports that he does not use drugs.    ROS: no fever, no nausea.        Objective:     Vitals:    10/02/18 1147   BP: 128/80   BP Location: Left arm   Patient " "Position: Sitting   Pulse: 78   Resp: 20   Temp: 36.4 °C (97.5 °F)   TempSrc: Temporal   SpO2: 97%   Weight: (!) 129 kg (284 lb 6.3 oz)   Height: 1.803 m (5' 10.98\")       Physical Exam:  General: alert in no apparent distress.   Neuro: Sensation intact in all dermatomal distributions of the bilateral lower extremities.  Cardio: 2+ DP pulses bilaterally. 2+ radial pulses bilaterally.        Assessment and Plan:     1. Paresthesias  This is an unusual constellation of symptoms. I discussed diagnostic options including EMG and laboratory workup. The patient would like to proceed with the abbreviated laboratory workup below to start. I discussed treatment options including treating concomitant anxiety with therapy or anxiety/neuropathy with SNRI. He doesn't currently think anxiety is a major player. The patient does not want to proceed with treatment at the current time.  - COMP METABOLIC PANEL; Future  - VITAMIN B12; Future  - TSH WITH REFLEX TO FT4; Future  - GEETA REFLEXIVE PROFILE; Future  - WESTERGREN SED RATE; Future  - RHEUMATOID ARTHRITIS FACTOR; Future  - VITAMIN B1; Future  - FERRITIN; Future    2. Screening for ischemic heart disease  - LIPID PROFILE; Future    3. Cough  Improved.   - continue neti pot and humidifier.   - if recurs, consider PPI or ENT consult.     4. Acute right-sided low back pain without sciatica  Improved.     5. Other chest pain  Improved.     6. Numbness of foot  - see above.         Followup: Return in about 6 weeks (around 11/13/2018), or if symptoms worsen or fail to improve.         "

## 2018-10-02 NOTE — ASSESSMENT & PLAN NOTE
I have been treating the patient with Flonase, Allegra, Neti pot and humidifier for an intermittent dry cough worse in the mornings with a globus sensation nonresponsive to a short course of low-dose Prilosec. Patient states his symptoms have improved, however, sometimes he will get an occasional cough. The chest x-ray was normal.

## 2018-10-03 ENCOUNTER — HOSPITAL ENCOUNTER (OUTPATIENT)
Dept: LAB | Facility: MEDICAL CENTER | Age: 38
End: 2018-10-03
Attending: FAMILY MEDICINE
Payer: COMMERCIAL

## 2018-10-03 DIAGNOSIS — R20.2 PARESTHESIAS: ICD-10-CM

## 2018-10-03 DIAGNOSIS — Z13.6 SCREENING FOR ISCHEMIC HEART DISEASE: ICD-10-CM

## 2018-10-03 LAB
ALBUMIN SERPL BCP-MCNC: 4.3 G/DL (ref 3.2–4.9)
ALBUMIN/GLOB SERPL: 1.4 G/DL
ALP SERPL-CCNC: 68 U/L (ref 30–99)
ALT SERPL-CCNC: 34 U/L (ref 2–50)
ANION GAP SERPL CALC-SCNC: 5 MMOL/L (ref 0–11.9)
AST SERPL-CCNC: 26 U/L (ref 12–45)
BILIRUB SERPL-MCNC: 0.7 MG/DL (ref 0.1–1.5)
BUN SERPL-MCNC: 16 MG/DL (ref 8–22)
CALCIUM SERPL-MCNC: 10.1 MG/DL (ref 8.5–10.5)
CHLORIDE SERPL-SCNC: 103 MMOL/L (ref 96–112)
CHOLEST SERPL-MCNC: 149 MG/DL (ref 100–199)
CO2 SERPL-SCNC: 29 MMOL/L (ref 20–33)
CREAT SERPL-MCNC: 0.92 MG/DL (ref 0.5–1.4)
ERYTHROCYTE [SEDIMENTATION RATE] IN BLOOD BY WESTERGREN METHOD: 3 MM/HOUR (ref 0–15)
FASTING STATUS PATIENT QL REPORTED: NORMAL
FERRITIN SERPL-MCNC: 227.9 NG/ML (ref 22–322)
GLOBULIN SER CALC-MCNC: 3.1 G/DL (ref 1.9–3.5)
GLUCOSE SERPL-MCNC: 89 MG/DL (ref 65–99)
HDLC SERPL-MCNC: 50 MG/DL
LDLC SERPL CALC-MCNC: 79 MG/DL
POTASSIUM SERPL-SCNC: 4.1 MMOL/L (ref 3.6–5.5)
PROT SERPL-MCNC: 7.4 G/DL (ref 6–8.2)
RHEUMATOID FACT SER IA-ACNC: <10 IU/ML (ref 0–14)
SODIUM SERPL-SCNC: 137 MMOL/L (ref 135–145)
TRIGL SERPL-MCNC: 100 MG/DL (ref 0–149)
TSH SERPL DL<=0.005 MIU/L-ACNC: 2.59 UIU/ML (ref 0.38–5.33)
VIT B12 SERPL-MCNC: 194 PG/ML (ref 211–911)

## 2018-10-03 PROCEDURE — 85652 RBC SED RATE AUTOMATED: CPT

## 2018-10-03 PROCEDURE — 80061 LIPID PANEL: CPT

## 2018-10-03 PROCEDURE — 86038 ANTINUCLEAR ANTIBODIES: CPT

## 2018-10-03 PROCEDURE — 86340 INTRINSIC FACTOR ANTIBODY: CPT

## 2018-10-03 PROCEDURE — 80053 COMPREHEN METABOLIC PANEL: CPT

## 2018-10-03 PROCEDURE — 36415 COLL VENOUS BLD VENIPUNCTURE: CPT

## 2018-10-03 PROCEDURE — 82607 VITAMIN B-12: CPT

## 2018-10-03 PROCEDURE — 84425 ASSAY OF VITAMIN B-1: CPT

## 2018-10-03 PROCEDURE — 84443 ASSAY THYROID STIM HORMONE: CPT

## 2018-10-03 PROCEDURE — 82728 ASSAY OF FERRITIN: CPT

## 2018-10-03 PROCEDURE — 86431 RHEUMATOID FACTOR QUANT: CPT

## 2018-10-04 ENCOUNTER — TELEPHONE (OUTPATIENT)
Dept: MEDICAL GROUP | Facility: MEDICAL CENTER | Age: 38
End: 2018-10-04

## 2018-10-04 DIAGNOSIS — E53.8 VITAMIN B12 DEFICIENCY: ICD-10-CM

## 2018-10-04 NOTE — TELEPHONE ENCOUNTER
I spoke with Kathie @ the main lab and they will add on the intrinsic factor abAleyda FERREIRA to PCP.

## 2018-10-05 LAB
IF BLOCK AB SER QL RIA: NEGATIVE
NUCLEAR IGG SER QL IA: NORMAL

## 2018-10-09 ENCOUNTER — NON-PROVIDER VISIT (OUTPATIENT)
Dept: MEDICAL GROUP | Facility: MEDICAL CENTER | Age: 38
End: 2018-10-09
Payer: COMMERCIAL

## 2018-10-09 DIAGNOSIS — E53.8 VITAMIN B12 DEFICIENCY: ICD-10-CM

## 2018-10-09 LAB — VIT B1 BLD-MCNC: 131 NMOL/L (ref 70–180)

## 2018-10-09 RX ORDER — CYANOCOBALAMIN 1000 UG/ML
1000 INJECTION, SOLUTION INTRAMUSCULAR; SUBCUTANEOUS ONCE
Status: COMPLETED | OUTPATIENT
Start: 2018-10-09 | End: 2018-10-09

## 2018-10-09 RX ADMIN — CYANOCOBALAMIN 1000 MCG: 1000 INJECTION, SOLUTION INTRAMUSCULAR; SUBCUTANEOUS at 11:00

## 2018-11-02 ENCOUNTER — OFFICE VISIT (OUTPATIENT)
Dept: MEDICAL GROUP | Facility: MEDICAL CENTER | Age: 38
End: 2018-11-02
Payer: COMMERCIAL

## 2018-11-02 VITALS
RESPIRATION RATE: 20 BRPM | DIASTOLIC BLOOD PRESSURE: 70 MMHG | HEART RATE: 52 BPM | BODY MASS INDEX: 40.68 KG/M2 | SYSTOLIC BLOOD PRESSURE: 122 MMHG | HEIGHT: 71 IN | TEMPERATURE: 97.8 F | OXYGEN SATURATION: 97 % | WEIGHT: 290.57 LBS

## 2018-11-02 DIAGNOSIS — E53.8 VITAMIN B12 DEFICIENCY: ICD-10-CM

## 2018-11-02 DIAGNOSIS — N48.9 PENILE ABNORMALITY: ICD-10-CM

## 2018-11-02 DIAGNOSIS — R20.2 PARESTHESIAS: ICD-10-CM

## 2018-11-02 DIAGNOSIS — R05.9 COUGH: ICD-10-CM

## 2018-11-02 PROCEDURE — 99214 OFFICE O/P EST MOD 30 MIN: CPT | Performed by: FAMILY MEDICINE

## 2018-11-02 RX ORDER — POLYETHYLENE GLYCOL-3350 AND ELECTROLYTES 236; 6.74; 5.86; 2.97; 22.74 G/274.31G; G/274.31G; G/274.31G; G/274.31G; G/274.31G
POWDER, FOR SOLUTION ORAL
COMMUNITY
Start: 2018-08-23 | End: 2019-06-25

## 2018-11-02 NOTE — ASSESSMENT & PLAN NOTE
Patient describes a one-week history of congestion and cough, worse in the mornings.  He also had a sore throat a few days back but this resolved after using a Neti pot.  He denies fevers.  He denies chest pain or shortness of breath.  His symptoms are getting better.

## 2018-11-02 NOTE — ASSESSMENT & PLAN NOTE
"Patient has been having intermittent numbness in both feet as well as an episode of disseminated \"pinpricks\" that lasted a few hours.  I did perform a preliminary lab workup uncovering a B12 deficiency without intrinsic factor antibodies.  I treated the patient with B12 supplementation, however, he states his symptoms persist.  He does describe some healthcare related anxiety but is less inclined to believe that this may be the culprit.  He is interested in seeing a neurologist  "

## 2018-11-02 NOTE — ASSESSMENT & PLAN NOTE
A few weeks ago, the patient noticed a firm band on his left penis.  It is not painful.  He denies dysuria, urinary frequency or urinary urgency.  He denies testicular discomfort.

## 2018-11-02 NOTE — PROGRESS NOTES
"[facility]  Progress Note  Established Patient    Subjective:   Moris Moffett is a 38 y.o. male here today with a chief complaint of paresthesias. The patient is alone.     Cough  Patient describes a one-week history of congestion and cough, worse in the mornings.  He also had a sore throat a few days back but this resolved after using a Neti pot.  He denies fevers.  He denies chest pain or shortness of breath.  His symptoms are getting better.    Paresthesias  Patient has been having intermittent numbness in both feet as well as an episode of disseminated \"pinpricks\" that lasted a few hours.  I did perform a preliminary lab workup uncovering a B12 deficiency without intrinsic factor antibodies.  I treated the patient with B12 supplementation, however, he states his symptoms persist.  He does describe some healthcare related anxiety but is less inclined to believe that this may be the culprit.  He is interested in seeing a neurologist    Penile abnormality  A few weeks ago, the patient noticed a firm band on his left penis.  It is not painful.  He denies dysuria, urinary frequency or urinary urgency.  He denies testicular discomfort.    Vitamin B12 deficiency  We are actively replacing this with B12.  Intrinsic factor antibodies are negative.      Current Outpatient Prescriptions on File Prior to Visit   Medication Sig Dispense Refill   • cyanocobalamin (VITAMIN B12) 1000 MCG Tab Take 1 Tab by mouth every day. 90 Tab 4   • therapeutic multivitamin-minerals (THERAGRAN-M) Tab Take 1 Tab by mouth every day.       No current facility-administered medications on file prior to visit.        Past Medical History:   Diagnosis Date   • Obesity (BMI 35.0-39.9 without comorbidity)        Allergies: Amoxicillin    Surgical History:  has no past surgical history on file.    Family History: family history includes Cancer in his maternal aunt, maternal grandmother, and mother; Diabetes in his paternal grandmother; " "Heart Disease in his father; Hypertension in his mother.    Social History:  reports that he has never smoked. He has never used smokeless tobacco. He reports that he drinks alcohol. He reports that he does not use drugs.    ROS: no dizziness or lightheadedness. See HPI.        Objective:     Vitals:    11/02/18 0836   BP: 122/70   Patient Position: Sitting   Pulse: (!) 52   Resp: 20   Temp: 36.6 °C (97.8 °F)   TempSrc: Temporal   SpO2: 97%   Weight: (!) 131.8 kg (290 lb 9.1 oz)   Height: 1.803 m (5' 10.98\")       Physical Exam:  General: alert in no apparent distress.   Cardio: regular rate and rhythm, no murmurs, rubs or gallops.   Resp: CTAB no w/r/r.   ENMT: Tympanic membranes normal bilaterally.  Some pharyngeal cobblestoning with mucus in the back of throat.  No tonsillar exudates.  No cervical or supraclavicular lymphadenopathy.  : left fibrous band on lateral side of penis. No redness, swelling, tenderness or warmth.       Assessment and Plan:     1. Cough  Consistent with upper respiratory infection.  No evidence of bacterial process.   -Mucinex DM, Flonase, Neti pot.  -Return with new or worsening symptoms.  Return with persistent symptoms.     2. Vitamin B12 deficiency  -Continue oral B12 for now.  - VITAMIN B12; Future    3. Paresthesias  Offered Cymbalta or counseling to address any anxiety component, in conjunction with the neurologic symptoms.  Patient declines and would like to see neurology.  - REFERRAL TO NEUROLOGY    4. Penile abnormality  - REFERRAL TO UROLOGY        Followup: Return in about 6 months (around 5/2/2019), or if symptoms worsen or fail to improve.         "

## 2018-11-16 ENCOUNTER — OFFICE VISIT (OUTPATIENT)
Dept: NEUROLOGY | Facility: MEDICAL CENTER | Age: 38
End: 2018-11-16
Payer: COMMERCIAL

## 2018-11-16 VITALS
HEART RATE: 63 BPM | OXYGEN SATURATION: 94 % | TEMPERATURE: 98 F | SYSTOLIC BLOOD PRESSURE: 120 MMHG | HEIGHT: 71 IN | BODY MASS INDEX: 40.6 KG/M2 | WEIGHT: 290 LBS | DIASTOLIC BLOOD PRESSURE: 72 MMHG

## 2018-11-16 DIAGNOSIS — R20.2 PARESTHESIAS: Primary | ICD-10-CM

## 2018-11-16 DIAGNOSIS — G25.81 RLS (RESTLESS LEGS SYNDROME): ICD-10-CM

## 2018-11-16 PROCEDURE — 99205 OFFICE O/P NEW HI 60 MIN: CPT | Performed by: PSYCHIATRY & NEUROLOGY

## 2018-11-16 ASSESSMENT — ENCOUNTER SYMPTOMS
DEPRESSION: 0
TREMORS: 0
NAUSEA: 0
FALLS: 0
SENSORY CHANGE: 1
MEMORY LOSS: 0
SEIZURES: 0
SPEECH CHANGE: 0
MYALGIAS: 1
TINGLING: 1
FOCAL WEAKNESS: 1

## 2018-11-16 NOTE — PROGRESS NOTES
Subjective:      Moris Moffett is a 38 y.o. male who presents from the office of Dr. Soares, for consultation, with a history of diffuse paresthesias, restlessness and muscle spasms.    AGATA Subramanian is a pleasant 38-year-old right-handed gentleman who symptoms all seem to have started around the time of a knee injury about 8 weeks ago, though they may have been present prior to that, simply so mild they were flying under the radar screen.    He describes shocking sensations that were intermittent especially when they started about 8 weeks ago.  They were nearly continuous for about 2-3 hours when they started, they seem to involve any part of the body though mostly below the level of the neck.  They were brief, simply recurred, never with sequelae or other associated symptoms.    He describes intermittent weakness mostly with the upper extremities, left more than right, and this would also be random in onset, would resolve, but could last for hours at a time.  The extremity in its entirety would be involved.  He was able to use the arm, it was simply a subjective feeling that the arm was heavier and weaker.  Though he was aware of this earlier this year beginning in April, again symptoms seem to intensify after the knee insult 8 weeks ago.    The paresthesias were mostly a tingling sensation in the feet and hands.  They seem to be more apparent with weightbearing or when he would squeeze the fingertips together.  The intensity would vary from day today, there was never loss of sensation, actual weakness, gait ataxia, etc. they never ascended in a constant pattern.  For the first week or so they were pretty much a constant and fluctuating issue, then they became more intermittent.    He would also describe a feeling of muscle spasms that were random, he would never actually see muscles ripple or twitch, there was never a random involuntary movement of the muscle or outer joint.  These were diffuse, not  provoked by any circumstance or action, and were independent of the sensory complaints, shocking sensations, etc.    He also noted a restlessness with the left leg that was clearly more problematic the longer he was seated, most noticeable towards the end of the day and into the evenings, always improved upon when he would stand or walk, move the leg, etc.  He has never really been aware of this in the other leg or in the upper extremities.  He describes himself as always being a restless sleeper though a diagnosis of a sleep process or disorder was never made.  The symptoms seem to fluctuate.    He also describes diffuse pains that are random, brief in nature, and can involve the eyes, feet, upper extremities, abdominal wall, even the genitals.    A directed workup for all of the symptoms has not been done, though given his heterozygote status with MTHFR, thorough serologies have been done revealing a B12 deficiency and he has been supplemented.  He has no history of venous thrombosis or other vascular compromise.    Other than the above, the medical history is remarkable only for obesity and B12 deficiency.  He denies a history of diabetes, hypertension, MS, seizure, migraine, RLS, neuro degenerative disease, neuropathic disease, neuromuscular disease or myopathic disease, malignancy, thyroid disease, autoimmune disease or psychiatric disease.  There is no surgical history of note from my standpoint.    The family history is unremarkable for similar symptoms, his mother has breast cancer and hypertension, his father heart disease, both his brothers and his 2 sons are all alive and well.  There is no history of tobacco use, he drinks alcohol occasionally, there is no history of other recreational drug use.  Other than his B12 supplement, he is on no medications.    Review of Systems   Constitutional: Negative for malaise/fatigue.   Gastrointestinal: Negative for nausea.   Genitourinary: Negative for dysuria.  "  Musculoskeletal: Positive for joint pain and myalgias. Negative for falls.   Skin: Negative for rash.   Neurological: Positive for tingling, sensory change and focal weakness. Negative for tremors, speech change and seizures.   Psychiatric/Behavioral: Negative for depression and memory loss.   All other systems reviewed and are negative.       Objective:     /72 (BP Location: Left arm, Patient Position: Sitting, BP Cuff Size: Adult)   Pulse 63   Temp 36.7 °C (98 °F) (Temporal)   Ht 1.803 m (5' 11\")   Wt (!) 131.5 kg (290 lb)   SpO2 94%   BMI 40.45 kg/m²      Physical Exam    He appears in no acute distress.  His vital signs are stable.  There is no malar rash or jaw claudication.  The neck is supple, range of motion is full, Lhermitte phenomena is absent.  Cardiac evaluation reveals a regular rhythm.  There is no lower extremity edema.  There is no obvious rash, evidence of diffuse joint swelling, or bruising.    Cognition is intact, fully oriented, there is no aphasia, apraxia, or inattention.    PERRLA/EOMI, visual fields are full to movement detection on confrontation, facial movements are symmetric, sensory exam is intact to temperature and pinprick bilaterally, the tongue and uvula are midline, there is no bulbar dysfunction, shoulder shrug and head rotation are intact.    Musculoskeletal exam reveals normal tone bilaterally, there is no tremor, asterixis, or drift.  Strength is 5/5 in all muscle groups in all 4 extremities, reflexes are brisk and present at all points without asymmetries, none are dropped, both toes are downgoing.    He walks with normal station, there is no appendicular incoordination with any of the extremities.  Repetitive movements and fine motor control with the hands, fingers and feet are intact with normal amplitude and frequencies.  Heel, toe, and tandem walking are intact.    Sensory exam is intact to vibration, pinprick and temperature.  Romberg is absent.   "   Assessment/Plan:     1. Paresthesias  Most likely benign, and I suspect all of his symptoms are benign, not representative of a degenerative process, EMG/NCV studies can be done for thoroughness since axonal neuropathies often fly under the radar screen when it comes to clinical examination.  There is nothing to suggest a central nervous system pathology such as demyelinating disease, autoimmune disease, ischemia, etc., so I do not think imaging is necessary.  If the studies are normal, I do not think further neurologic interventions are required, I think mostly handholding and reassurance that the symptoms are benign is all that will be necessary.  This has nothing to do with his heterozygous MTHFR state, and he lacks the history of typical risk for polyneuropathy including a family history absent of suffers with similar fate.  The rationale for all of this was reviewed, he felt quite comfortable with this.  I do not think symptomatic relief is really necessary, he agrees.  We will call him with test results if they are abnormal, otherwise he can follow-up with Dr. Soares as needed.    - REFERRAL TO NEURODIAGNOSTICS (EEG,EP,EMG/NCS/DBS) Modality Requested: EMG/NCS-Comment Extremities  - WESTERGREN SED RATE; Future  - CRP HIGH SENSITIVE (CARDIAC); Future    2. RLS (restless legs syndrome)  This condition I suspect he really does have, but it is also benign.  Still, it can progress slowly over time.  I think this is sporadic, not likely symptomatic of a neuropathy (another reason for EMG/NCV), autoimmune disease, or an iron deficiency state, ferritin levels have already been checked and found normal, I will check a total iron as well as transferrin just to be sure.  If iron is low, it should be supplemented, it might provide some benefit, but I suspect he will simply be dealing with this for long-term.    We talked about RLS and the symptoms suggestive of it, what to expect into the future, and what medications can  be used if necessary and needed.  Again a benign process, neurologic interventions are not required, symptomatic relief would entail dopamine agonist such as Requip, Mirapex, even gabapentin at low dose can be effective.    - IRON/TOTAL IRON BIND; Standing  - TRANSFERRIN; Future  - IRON/TOTAL IRON BIND    Time: 60 minutes was spent face-to-face for exam, review, discussion, and education, of this over 50% of the time spent counseling and coordinating care.

## 2019-02-12 ENCOUNTER — APPOINTMENT (RX ONLY)
Dept: URBAN - METROPOLITAN AREA CLINIC 22 | Facility: CLINIC | Age: 39
Setting detail: DERMATOLOGY
End: 2019-02-12

## 2019-02-12 DIAGNOSIS — D22 MELANOCYTIC NEVI: ICD-10-CM

## 2019-02-12 DIAGNOSIS — Z71.89 OTHER SPECIFIED COUNSELING: ICD-10-CM

## 2019-02-12 DIAGNOSIS — L81.4 OTHER MELANIN HYPERPIGMENTATION: ICD-10-CM

## 2019-02-12 DIAGNOSIS — B07.8 OTHER VIRAL WARTS: ICD-10-CM

## 2019-02-12 DIAGNOSIS — D18.0 HEMANGIOMA: ICD-10-CM

## 2019-02-12 PROBLEM — D23.39 OTHER BENIGN NEOPLASM OF SKIN OF OTHER PARTS OF FACE: Status: ACTIVE | Noted: 2019-02-12

## 2019-02-12 PROBLEM — D18.01 HEMANGIOMA OF SKIN AND SUBCUTANEOUS TISSUE: Status: ACTIVE | Noted: 2019-02-12

## 2019-02-12 PROBLEM — D22.5 MELANOCYTIC NEVI OF TRUNK: Status: ACTIVE | Noted: 2019-02-12

## 2019-02-12 PROCEDURE — ? PRESCRIPTION

## 2019-02-12 PROCEDURE — ? COUNSELING

## 2019-02-12 PROCEDURE — 99203 OFFICE O/P NEW LOW 30 MIN: CPT | Mod: 25

## 2019-02-12 PROCEDURE — ? LIQUID NITROGEN

## 2019-02-12 PROCEDURE — 17110 DESTRUCTION B9 LES UP TO 14: CPT

## 2019-02-12 RX ORDER — IMIQUIMOD 50 MG/G
1 CREAM TOPICAL
Qty: 1 | Refills: 0 | Status: ERX | COMMUNITY
Start: 2019-02-12 | End: 2020-11-25

## 2019-02-12 RX ADMIN — IMIQUIMOD 1: 50 CREAM TOPICAL at 00:00

## 2019-02-12 ASSESSMENT — LOCATION SIMPLE DESCRIPTION DERM
LOCATION SIMPLE: ABDOMEN
LOCATION SIMPLE: LEFT UPPER BACK
LOCATION SIMPLE: RIGHT MIDDLE FINGER

## 2019-02-12 ASSESSMENT — LOCATION ZONE DERM
LOCATION ZONE: TRUNK
LOCATION ZONE: FINGER

## 2019-02-12 ASSESSMENT — LOCATION DETAILED DESCRIPTION DERM
LOCATION DETAILED: LEFT SUPERIOR MEDIAL UPPER BACK
LOCATION DETAILED: EPIGASTRIC SKIN
LOCATION DETAILED: RIGHT MIDDLE PROXIMAL INTERPHALANGEAL JOINT

## 2019-02-12 NOTE — PROCEDURE: LIQUID NITROGEN

## 2019-02-26 ENCOUNTER — APPOINTMENT (RX ONLY)
Dept: URBAN - METROPOLITAN AREA CLINIC 22 | Facility: CLINIC | Age: 39
Setting detail: DERMATOLOGY
End: 2019-02-26

## 2019-02-26 DIAGNOSIS — L85.3 XEROSIS CUTIS: ICD-10-CM

## 2019-02-26 DIAGNOSIS — B07.8 OTHER VIRAL WARTS: ICD-10-CM | Status: IMPROVED

## 2019-02-26 DIAGNOSIS — Z71.89 OTHER SPECIFIED COUNSELING: ICD-10-CM

## 2019-02-26 PROCEDURE — 11056 PARNG/CUTG B9 HYPRKR LES 2-4: CPT | Mod: 59

## 2019-02-26 PROCEDURE — 17110 DESTRUCTION B9 LES UP TO 14: CPT

## 2019-02-26 PROCEDURE — ? COUNSELING

## 2019-02-26 PROCEDURE — ? LIQUID NITROGEN

## 2019-02-26 PROCEDURE — ? PARING HYPERKERATOTIC LESION

## 2019-02-26 PROCEDURE — 99213 OFFICE O/P EST LOW 20 MIN: CPT | Mod: 25

## 2019-02-26 ASSESSMENT — LOCATION SIMPLE DESCRIPTION DERM
LOCATION SIMPLE: RIGHT MIDDLE FINGER
LOCATION SIMPLE: LEFT HAND
LOCATION SIMPLE: RIGHT HAND

## 2019-02-26 ASSESSMENT — LOCATION DETAILED DESCRIPTION DERM
LOCATION DETAILED: RIGHT MIDDLE PROXIMAL INTERPHALANGEAL JOINT
LOCATION DETAILED: RIGHT RADIAL DORSAL HAND
LOCATION DETAILED: LEFT ULNAR DORSAL HAND

## 2019-02-26 ASSESSMENT — LOCATION ZONE DERM
LOCATION ZONE: FINGER
LOCATION ZONE: HAND

## 2019-02-26 NOTE — PROCEDURE: LIQUID NITROGEN
Include Z78.9 (Other Specified Conditions Influencing Health Status) As An Associated Diagnosis?: No
Medical Necessity Information: It is in your best interest to select a reason for this procedure from the list below. All of these items fulfill various CMS LCD requirements except the new and changing color options.
Medical Necessity Clause: This procedure was medically necessary because the lesions that were treated were:
Number Of Freeze-Thaw Cycles: 3 freeze-thaw cycles
Consent: The patient's consent was obtained including but not limited to risks of crusting, scabbing, blistering, scarring, darker or lighter pigmentary change, recurrence, incomplete removal and infection.
Post-Care Instructions: I reviewed with the patient in detail post-care instructions. Patient is to wear sunprotection, and avoid picking at any of the treated lesions. Pt may apply Vaseline to crusted or scabbing areas.
Detail Level: Detailed

## 2019-02-26 NOTE — PROCEDURE: PARING HYPERKERATOTIC LESION
Medical Necessity Clause: This procedure was medically necessary because the patient has
Paring Method: curette
Medical Necessity Information: LCD Guidelines vary from payer to payer. Please check with your payer's policy to determine medical necessity. Many payers require at least 1 Class A indication, 2 Class B indications or 1 Class B and 2 Class C to qualify for insurance payment.

## 2019-03-12 ENCOUNTER — APPOINTMENT (RX ONLY)
Dept: URBAN - METROPOLITAN AREA CLINIC 22 | Facility: CLINIC | Age: 39
Setting detail: DERMATOLOGY
End: 2019-03-12

## 2019-03-12 DIAGNOSIS — Z71.89 OTHER SPECIFIED COUNSELING: ICD-10-CM

## 2019-03-12 DIAGNOSIS — L85.3 XEROSIS CUTIS: ICD-10-CM

## 2019-03-12 DIAGNOSIS — B07.8 OTHER VIRAL WARTS: ICD-10-CM

## 2019-03-12 PROCEDURE — 99213 OFFICE O/P EST LOW 20 MIN: CPT | Mod: 25

## 2019-03-12 PROCEDURE — ? CANTHARIDIN

## 2019-03-12 PROCEDURE — ? COUNSELING

## 2019-03-12 PROCEDURE — 17110 DESTRUCTION B9 LES UP TO 14: CPT

## 2019-03-12 PROCEDURE — ? PARING HYPERKERATOTIC LESION

## 2019-03-12 ASSESSMENT — LOCATION SIMPLE DESCRIPTION DERM
LOCATION SIMPLE: RIGHT MIDDLE FINGER
LOCATION SIMPLE: LEFT HAND
LOCATION SIMPLE: RIGHT HAND

## 2019-03-12 ASSESSMENT — LOCATION ZONE DERM
LOCATION ZONE: HAND
LOCATION ZONE: FINGER

## 2019-03-12 ASSESSMENT — LOCATION DETAILED DESCRIPTION DERM
LOCATION DETAILED: RIGHT MIDDLE PROXIMAL INTERPHALANGEAL JOINT
LOCATION DETAILED: LEFT ULNAR DORSAL HAND
LOCATION DETAILED: RIGHT RADIAL DORSAL HAND

## 2019-03-12 NOTE — PROCEDURE: CANTHARIDIN
home
Consent: The patient's consent was obtained including but not limited to risks of crusting, scabbing, scarring, blistering, darker or lighter pigmentary change, recurrence, incomplete removal and infection.
Strength: Alvin
Add 52 Modifier (Optional): no
Medical Necessity Information: It is in your best interest to select a reason for this procedure from the list below. All of these items fulfill various CMS LCD requirements except the new and changing color options.
Medical Necessity Clause: This procedure was medically necessary because the lesions that were treated were:
Curette Text: Prior to application of cantharidin the lesions were lightly pared with a curette.
Post-Care Instructions: I reviewed with the patient in detail post-care instructions. The patient understands that the treated areas should be washed off 6 to 8 hours after application.
Detail Level: Detailed

## 2019-04-02 ENCOUNTER — APPOINTMENT (RX ONLY)
Dept: URBAN - METROPOLITAN AREA CLINIC 22 | Facility: CLINIC | Age: 39
Setting detail: DERMATOLOGY
End: 2019-04-02

## 2019-04-02 DIAGNOSIS — B07.8 OTHER VIRAL WARTS: ICD-10-CM

## 2019-04-02 DIAGNOSIS — Z71.89 OTHER SPECIFIED COUNSELING: ICD-10-CM

## 2019-04-02 PROCEDURE — ? PARING HYPERKERATOTIC LESION

## 2019-04-02 PROCEDURE — 17110 DESTRUCTION B9 LES UP TO 14: CPT | Mod: 59

## 2019-04-02 PROCEDURE — ? COUNSELING

## 2019-04-02 PROCEDURE — ? LIQUID NITROGEN

## 2019-04-02 ASSESSMENT — LOCATION ZONE DERM: LOCATION ZONE: FINGER

## 2019-04-02 ASSESSMENT — LOCATION DETAILED DESCRIPTION DERM: LOCATION DETAILED: RIGHT MIDDLE PROXIMAL INTERPHALANGEAL JOINT

## 2019-04-02 ASSESSMENT — LOCATION SIMPLE DESCRIPTION DERM: LOCATION SIMPLE: RIGHT MIDDLE FINGER

## 2019-04-02 NOTE — HPI: WARTS (VERRUCA)
How Severe Are Your Warts?: moderate
Is This A New Presentation, Or A Follow-Up?: Follow Up Alycia
Treatment Number (Optional): 4

## 2019-05-02 ENCOUNTER — APPOINTMENT (OUTPATIENT)
Dept: MEDICAL GROUP | Facility: MEDICAL CENTER | Age: 39
End: 2019-05-02
Payer: COMMERCIAL

## 2019-06-25 ENCOUNTER — OFFICE VISIT (OUTPATIENT)
Dept: MEDICAL GROUP | Facility: MEDICAL CENTER | Age: 39
End: 2019-06-25
Payer: COMMERCIAL

## 2019-06-25 VITALS
WEIGHT: 307 LBS | RESPIRATION RATE: 18 BRPM | HEART RATE: 67 BPM | TEMPERATURE: 98 F | OXYGEN SATURATION: 95 % | HEIGHT: 71 IN | DIASTOLIC BLOOD PRESSURE: 84 MMHG | BODY MASS INDEX: 42.98 KG/M2 | SYSTOLIC BLOOD PRESSURE: 122 MMHG

## 2019-06-25 DIAGNOSIS — R05.9 COUGH: ICD-10-CM

## 2019-06-25 DIAGNOSIS — E53.8 VITAMIN B12 DEFICIENCY: ICD-10-CM

## 2019-06-25 DIAGNOSIS — Z00.00 HEALTHCARE MAINTENANCE: ICD-10-CM

## 2019-06-25 PROCEDURE — 99395 PREV VISIT EST AGE 18-39: CPT | Performed by: FAMILY MEDICINE

## 2019-06-25 ASSESSMENT — PATIENT HEALTH QUESTIONNAIRE - PHQ9: CLINICAL INTERPRETATION OF PHQ2 SCORE: 0

## 2019-06-25 NOTE — PROGRESS NOTES
"[facility]  Progress Note  Established Patient    Subjective:   Moris Moffett is a 38 y.o. male here today for a wellness visit. The patient is alone.     Healthcare maintenance  Lipids: ordered.  Fasting Glucose: ordered.    Tdap: Patient declines.    Cough  Patient states he has had a slightly productive cough for the past month.  He denies associated fevers, chest pain or shortness of breath.  Cough tends to occur when he is outside but it is not present when he is indoors.  He has tried a little bit of Allegra but not really consistently.  It tends to occur seasonally.  Patient did have a chest x-ray which was reassuring last year.     Vitamin B12 deficiency  We are actively replacing this with B12.  Intrinsic factor antibodies are negative.      Current Outpatient Prescriptions on File Prior to Visit   Medication Sig Dispense Refill   • cyanocobalamin (VITAMIN B12) 1000 MCG Tab Take 1 Tab by mouth every day. 90 Tab 4     No current facility-administered medications on file prior to visit.        Past Medical History:   Diagnosis Date   • Obesity (BMI 35.0-39.9 without comorbidity)        Allergies: Amoxicillin    Surgical History:  has no past surgical history on file.    Family History: family history includes Cancer in his maternal aunt, maternal grandmother, and mother; Diabetes in his paternal grandmother; Heart Disease in his father; Hypertension in his mother.    Social History:  reports that he has never smoked. He has never used smokeless tobacco. He reports that he drinks alcohol. He reports that he does not use drugs.    ROS: see HPI.        Objective:     Vitals:    06/25/19 0825   BP: 122/84   BP Location: Left arm   Patient Position: Sitting   BP Cuff Size: Large adult   Pulse: 67   Resp: 18   Temp: 36.7 °C (98 °F)   TempSrc: Temporal   SpO2: 95%   Weight: (!) 139.3 kg (307 lb)   Height: 1.803 m (5' 11\")       Physical Exam:  General: alert in no apparent distress.   Cardio: " regular rate and rhythm, no murmurs, rubs or gallops.   Resp: CTAB no w/r/r.   ENMT: Tympanic membranes normal bilaterally.  There is some pharyngeal cobblestoning but no pharyngeal erythema.  Uvula midline.  No cervical adenopathy.        Assessment and Plan:     1. Vitamin B12 deficiency  - continue oral B12.   - VITAMIN B12; Future    2. Healthcare maintenance  - see HPI.   - discussed diet and exercise, Mediterranean Diet handout given.   - Lipid Profile; Future  - Basic Metabolic Panel; Future    3. Cough  Likely due to seasonal allergies.  Recommended humidifier use at night, Flonase before bed and Allegra in the morning.  I informed the patient that if his symptoms do not resolve within 4 weeks, he needs to let me know.  At that point we would get some imaging.         Followup: Return in about 1 year (around 6/25/2020), or if symptoms worsen or fail to improve, for Wellness Visit, Long.

## 2019-06-25 NOTE — ASSESSMENT & PLAN NOTE
Patient states he has had a slightly productive cough for the past month.  He denies associated fevers, chest pain or shortness of breath.  Cough tends to occur when he is outside but it is not present when he is indoors.  He has tried a little bit of Allegra but not really consistently.  It tends to occur seasonally.  Patient did have a chest x-ray which was reassuring last year.

## 2020-01-16 ENCOUNTER — OFFICE VISIT (OUTPATIENT)
Dept: MEDICAL GROUP | Facility: MEDICAL CENTER | Age: 40
End: 2020-01-16
Payer: COMMERCIAL

## 2020-01-16 VITALS
OXYGEN SATURATION: 95 % | HEART RATE: 74 BPM | TEMPERATURE: 97.4 F | HEIGHT: 71 IN | DIASTOLIC BLOOD PRESSURE: 70 MMHG | BODY MASS INDEX: 42.7 KG/M2 | RESPIRATION RATE: 20 BRPM | WEIGHT: 305 LBS | SYSTOLIC BLOOD PRESSURE: 134 MMHG

## 2020-01-16 DIAGNOSIS — R00.2 PALPITATIONS: ICD-10-CM

## 2020-01-16 DIAGNOSIS — E53.8 VITAMIN B12 DEFICIENCY: ICD-10-CM

## 2020-01-16 DIAGNOSIS — Z00.00 HEALTHCARE MAINTENANCE: ICD-10-CM

## 2020-01-16 DIAGNOSIS — R05.9 COUGH: ICD-10-CM

## 2020-01-16 PROCEDURE — 99396 PREV VISIT EST AGE 40-64: CPT | Performed by: FAMILY MEDICINE

## 2020-01-16 PROCEDURE — 93000 ELECTROCARDIOGRAM COMPLETE: CPT | Performed by: FAMILY MEDICINE

## 2020-01-16 ASSESSMENT — PATIENT HEALTH QUESTIONNAIRE - PHQ9: CLINICAL INTERPRETATION OF PHQ2 SCORE: 0

## 2020-01-16 NOTE — ASSESSMENT & PLAN NOTE
Patient states that for about 5 weeks he has had what he first described as palpitations but subsequently described as stress.  When I asked him about palpitations a second time, he stated that these were not necessarily palpitations, nor was it chest pain or shortness of breath.  Nonetheless, this feeling occurred during a period of stress but has completely resolved over the past 2 weeks.  He has some difficulty describing the feeling.

## 2020-01-16 NOTE — ASSESSMENT & PLAN NOTE
Lipids: ordered.  Fasting Glucose: ordered.    Tdap: Recommended, patient declined.  He recently cut himself and this is healing well with no evidence of infection and so I recommended the Tdap again.  Patient declined.  Flu vaccine: Patient declined.

## 2020-01-17 NOTE — PROGRESS NOTES
[facility]  Progress Note  Established Patient    Subjective:   Moris Moffett is a 39 y.o. male here today for a wellness exam. The patient is alone.     Healthcare maintenance  Lipids: ordered.  Fasting Glucose: ordered.    Tdap: Recommended, patient declined.  He recently cut himself and this is healing well with no evidence of infection and so I recommended the Tdap again.  Patient declined.  Flu vaccine: Patient declined.    Palpitations  Patient states that for about 5 weeks he has had what he first described as palpitations but subsequently described as stress.  When I asked him about palpitations a second time, he stated that these were not necessarily palpitations, nor was it chest pain or shortness of breath.  Nonetheless, this feeling occurred during a period of stress but has completely resolved over the past 2 weeks.  He has some difficulty describing the feeling.    Cough  This has resolved.    Vitamin B12 deficiency  With a negative intrinsic factor testing.  The patient is maintained on B12 supplementation.      Current Outpatient Medications on File Prior to Visit   Medication Sig Dispense Refill   • cyanocobalamin (VITAMIN B12) 1000 MCG Tab Take 1 Tab by mouth every day. 90 Tab 4     No current facility-administered medications on file prior to visit.        Past Medical History:   Diagnosis Date   • Obesity (BMI 35.0-39.9 without comorbidity)        Allergies: Amoxicillin    Surgical History:  has no past surgical history on file.    Family History: family history includes Cancer in his maternal aunt, maternal grandmother, and mother; Diabetes in his paternal grandmother; Heart Disease in his father; Hypertension in his mother.    Social History:  reports that he has never smoked. He has never used smokeless tobacco. He reports current alcohol use. He reports that he does not use drugs.    ROS: see HPI.        Objective:     Vitals:    01/16/20 1513   BP: 134/70   BP Location: Left  "arm   Patient Position: Sitting   BP Cuff Size: Large adult   Pulse: 74   Resp: 20   Temp: 36.3 °C (97.4 °F)   TempSrc: Temporal   SpO2: 95%   Weight: (!) 138.3 kg (305 lb)   Height: 1.803 m (5' 11\")       Physical Exam:  General: alert in no apparent distress.   Cardio: regular rate and rhythm, no murmurs, rubs or gallops.   Resp: CTAB no w/r/r.         Assessment and Plan:     1. Vitamin B12 deficiency  - continue B12.  - VITAMIN B12; Future    2. Healthcare maintenance  -Age-appropriate anticipatory guidance discussed, including diet and exercise.  -Vaccines recommended, patient declined.  - Comp Metabolic Panel; Future  - Lipid Profile; Future    3. Palpitations  EKG normal. Will get TSH. Unusual description of symptoms, I'm reassured they're gone. Asked him to let me know if the symptoms recur.   - EKG - Clinic Performed  - TSH WITH REFLEX TO FT4; Future    4. Cough  - resolved.         Followup: Return in about 1 year (around 1/16/2021), or if symptoms worsen or fail to improve, for Wellness Visit, Long.         "

## 2020-06-30 ENCOUNTER — HOSPITAL ENCOUNTER (OUTPATIENT)
Dept: LAB | Facility: MEDICAL CENTER | Age: 40
End: 2020-06-30
Attending: FAMILY MEDICINE
Payer: COMMERCIAL

## 2020-06-30 DIAGNOSIS — E53.8 VITAMIN B12 DEFICIENCY: ICD-10-CM

## 2020-06-30 DIAGNOSIS — Z00.00 HEALTHCARE MAINTENANCE: ICD-10-CM

## 2020-06-30 DIAGNOSIS — R00.2 PALPITATIONS: ICD-10-CM

## 2020-06-30 LAB
ALBUMIN SERPL BCP-MCNC: 4.1 G/DL (ref 3.2–4.9)
ALBUMIN/GLOB SERPL: 1.5 G/DL
ALP SERPL-CCNC: 63 U/L (ref 30–99)
ALT SERPL-CCNC: 38 U/L (ref 2–50)
ANION GAP SERPL CALC-SCNC: 14 MMOL/L (ref 7–16)
AST SERPL-CCNC: 35 U/L (ref 12–45)
BILIRUB SERPL-MCNC: 0.7 MG/DL (ref 0.1–1.5)
BUN SERPL-MCNC: 18 MG/DL (ref 8–22)
CALCIUM SERPL-MCNC: 9.1 MG/DL (ref 8.5–10.5)
CHLORIDE SERPL-SCNC: 104 MMOL/L (ref 96–112)
CHOLEST SERPL-MCNC: 161 MG/DL (ref 100–199)
CO2 SERPL-SCNC: 21 MMOL/L (ref 20–33)
CREAT SERPL-MCNC: 0.92 MG/DL (ref 0.5–1.4)
FASTING STATUS PATIENT QL REPORTED: NORMAL
GLOBULIN SER CALC-MCNC: 2.7 G/DL (ref 1.9–3.5)
GLUCOSE SERPL-MCNC: 92 MG/DL (ref 65–99)
HDLC SERPL-MCNC: 44 MG/DL
LDLC SERPL CALC-MCNC: 87 MG/DL
POTASSIUM SERPL-SCNC: 3.7 MMOL/L (ref 3.6–5.5)
PROT SERPL-MCNC: 6.8 G/DL (ref 6–8.2)
SODIUM SERPL-SCNC: 139 MMOL/L (ref 135–145)
TRIGL SERPL-MCNC: 149 MG/DL (ref 0–149)
TSH SERPL DL<=0.005 MIU/L-ACNC: 4.02 UIU/ML (ref 0.38–5.33)
VIT B12 SERPL-MCNC: 753 PG/ML (ref 211–911)

## 2020-06-30 PROCEDURE — 84443 ASSAY THYROID STIM HORMONE: CPT

## 2020-06-30 PROCEDURE — 36415 COLL VENOUS BLD VENIPUNCTURE: CPT

## 2020-06-30 PROCEDURE — 82607 VITAMIN B-12: CPT

## 2020-06-30 PROCEDURE — 80053 COMPREHEN METABOLIC PANEL: CPT

## 2020-06-30 PROCEDURE — 80061 LIPID PANEL: CPT

## 2020-11-25 ENCOUNTER — APPOINTMENT (RX ONLY)
Dept: URBAN - METROPOLITAN AREA CLINIC 35 | Facility: CLINIC | Age: 40
Setting detail: DERMATOLOGY
End: 2020-11-25

## 2020-11-25 VITALS — WEIGHT: 315 LBS | HEIGHT: 72 IN

## 2020-11-25 DIAGNOSIS — D22 MELANOCYTIC NEVI: ICD-10-CM

## 2020-11-25 DIAGNOSIS — Z71.89 OTHER SPECIFIED COUNSELING: ICD-10-CM

## 2020-11-25 DIAGNOSIS — L81.4 OTHER MELANIN HYPERPIGMENTATION: ICD-10-CM

## 2020-11-25 DIAGNOSIS — L82.1 OTHER SEBORRHEIC KERATOSIS: ICD-10-CM

## 2020-11-25 PROBLEM — D22.39 MELANOCYTIC NEVI OF OTHER PARTS OF FACE: Status: ACTIVE | Noted: 2020-11-25

## 2020-11-25 PROBLEM — D23.39 OTHER BENIGN NEOPLASM OF SKIN OF OTHER PARTS OF FACE: Status: ACTIVE | Noted: 2020-11-25

## 2020-11-25 PROBLEM — D22.62 MELANOCYTIC NEVI OF LEFT UPPER LIMB, INCLUDING SHOULDER: Status: ACTIVE | Noted: 2020-11-25

## 2020-11-25 PROBLEM — D22.5 MELANOCYTIC NEVI OF TRUNK: Status: ACTIVE | Noted: 2020-11-25

## 2020-11-25 PROCEDURE — ? COUNSELING

## 2020-11-25 PROCEDURE — 99214 OFFICE O/P EST MOD 30 MIN: CPT

## 2020-11-25 ASSESSMENT — LOCATION SIMPLE DESCRIPTION DERM
LOCATION SIMPLE: CHEST
LOCATION SIMPLE: RIGHT UPPER BACK
LOCATION SIMPLE: LEFT BUTTOCK
LOCATION SIMPLE: LEFT SHOULDER
LOCATION SIMPLE: RIGHT FOREARM
LOCATION SIMPLE: LEFT UPPER ARM
LOCATION SIMPLE: ABDOMEN
LOCATION SIMPLE: LEFT CHEEK

## 2020-11-25 ASSESSMENT — LOCATION DETAILED DESCRIPTION DERM
LOCATION DETAILED: RIGHT MEDIAL INFERIOR CHEST
LOCATION DETAILED: LEFT BUTTOCK
LOCATION DETAILED: EPIGASTRIC SKIN
LOCATION DETAILED: LEFT PROXIMAL POSTERIOR UPPER ARM
LOCATION DETAILED: RIGHT SUPERIOR UPPER BACK
LOCATION DETAILED: LEFT CENTRAL MALAR CHEEK
LOCATION DETAILED: PERIUMBILICAL SKIN
LOCATION DETAILED: RIGHT DISTAL DORSAL FOREARM
LOCATION DETAILED: LEFT POSTERIOR SHOULDER
LOCATION DETAILED: LEFT DISTAL POSTERIOR UPPER ARM

## 2020-11-25 ASSESSMENT — LOCATION ZONE DERM
LOCATION ZONE: TRUNK
LOCATION ZONE: ARM
LOCATION ZONE: FACE

## 2021-01-19 ENCOUNTER — OFFICE VISIT (OUTPATIENT)
Dept: MEDICAL GROUP | Facility: MEDICAL CENTER | Age: 41
End: 2021-01-19
Payer: COMMERCIAL

## 2021-01-19 VITALS
HEART RATE: 61 BPM | BODY MASS INDEX: 42.66 KG/M2 | TEMPERATURE: 98 F | SYSTOLIC BLOOD PRESSURE: 136 MMHG | HEIGHT: 72 IN | WEIGHT: 315 LBS | OXYGEN SATURATION: 97 % | DIASTOLIC BLOOD PRESSURE: 86 MMHG | RESPIRATION RATE: 18 BRPM

## 2021-01-19 DIAGNOSIS — E53.8 VITAMIN B12 DEFICIENCY: ICD-10-CM

## 2021-01-19 DIAGNOSIS — Z00.00 HEALTHCARE MAINTENANCE: ICD-10-CM

## 2021-01-19 PROBLEM — R10.11 RIGHT UPPER QUADRANT PAIN: Status: RESOLVED | Noted: 2018-06-20 | Resolved: 2021-01-19

## 2021-01-19 PROBLEM — R20.0 NUMBNESS OF FOOT: Status: RESOLVED | Noted: 2018-06-20 | Resolved: 2021-01-19

## 2021-01-19 PROBLEM — R20.2 PARESTHESIAS: Status: RESOLVED | Noted: 2018-10-02 | Resolved: 2021-01-19

## 2021-01-19 PROBLEM — N48.9 PENILE ABNORMALITY: Status: RESOLVED | Noted: 2018-11-02 | Resolved: 2021-01-19

## 2021-01-19 PROBLEM — R00.2 PALPITATIONS: Status: RESOLVED | Noted: 2020-01-16 | Resolved: 2021-01-19

## 2021-01-19 PROBLEM — R19.5 CHANGE IN STOOL: Status: RESOLVED | Noted: 2018-06-20 | Resolved: 2021-01-19

## 2021-01-19 PROCEDURE — 99396 PREV VISIT EST AGE 40-64: CPT | Performed by: FAMILY MEDICINE

## 2021-01-19 ASSESSMENT — PATIENT HEALTH QUESTIONNAIRE - PHQ9: CLINICAL INTERPRETATION OF PHQ2 SCORE: 0

## 2021-01-20 NOTE — ASSESSMENT & PLAN NOTE
Lipids: ordered.  Fasting Glucose: ordered.    Tdap: Recommended, patient declined.  Flu vaccine: Recommended, patient declined.

## 2021-01-20 NOTE — PROGRESS NOTES
"[facility]  Progress Note  Established Patient    Subjective:   Moris Moffett is a 40 y.o. male here today for a wellness visit. The patient is alone, both of us are masked.     Vitamin B12 deficiency  Patient is maintained on B12 supplementation.    Healthcare maintenance  Lipids: ordered.  Fasting Glucose: ordered.    Tdap: Recommended, patient declined.  Flu vaccine: Recommended, patient declined.      Current Outpatient Medications on File Prior to Visit   Medication Sig Dispense Refill   • Cyanocobalamin (VITAMIN B-12) 1000 MCG Tab TAKE ONE TABLET BY MOUTH DAILY (VITAMIN B12) 100 Tab 4     No current facility-administered medications on file prior to visit.           Objective:     Vitals:    01/19/21 1511   BP: 136/86   BP Location: Left arm   Patient Position: Sitting   BP Cuff Size: Large adult   Pulse: 61   Resp: 18   Temp: 36.7 °C (98 °F)   TempSrc: Temporal   SpO2: 97%   Weight: (!) 143.8 kg (317 lb)   Height: 1.816 m (5' 11.5\")       Physical Exam:  General: alert in no apparent distress.   Neck: No carotid bruit.  Cardio: Regular rate and rhythm, no murmurs, rubs or gallops.  Respiratory: Clear to auscultation bilaterally.  Extremities: No pedal edema.        Assessment and Plan:     1. Vitamin B12 deficiency  -Continue B12 supplementation.  - VITAMIN B12; Future    2. Healthcare maintenance  - see HPI.   -Patient has a family history of cardiovascular disease.  I offered a coronary calcium score.  He declines at the current time.  -Age-appropriate anticipatory guidance discussed including diet.  - Blood Glucose; Future  - Lipid Profile; Future        Followup: Return in about 1 year (around 1/19/2022), or if symptoms worsen or fail to improve, for Wellness Visit, Long.         "

## 2021-04-20 ENCOUNTER — TELEMEDICINE (OUTPATIENT)
Dept: MEDICAL GROUP | Facility: MEDICAL CENTER | Age: 41
End: 2021-04-20
Payer: COMMERCIAL

## 2021-04-20 VITALS
TEMPERATURE: 96.9 F | BODY MASS INDEX: 41.99 KG/M2 | WEIGHT: 310 LBS | HEIGHT: 72 IN | HEART RATE: 84 BPM | OXYGEN SATURATION: 95 %

## 2021-04-20 DIAGNOSIS — R50.9 FEVER, UNSPECIFIED FEVER CAUSE: ICD-10-CM

## 2021-04-20 PROCEDURE — 99213 OFFICE O/P EST LOW 20 MIN: CPT | Mod: 95,CR | Performed by: FAMILY MEDICINE

## 2021-04-20 NOTE — ASSESSMENT & PLAN NOTE
Patient states that last Monday he developed pressure behind the eyes associated with a headache behind the eyes that may also have involved the frontal sinus.  Subjective fever started a few days after symptom onset, though the highest objective temperature he could get using home thermometer was 100 °F.  He has been taking Tylenol off-and-on.  His symptoms have been fairly consistent until today.  He has not yet had a fever today and is, overall, feeling quite well.  He denies associated ear pain, sore throat, chest pain, shortness of breath, cough or congestion.  Early on the course of the illness he did have some myalgias but he thinks it may be related to a game of pickle ball he played the Sunday preceding the illness.  Day 5 after symptoms began the patient was tested for Covid and was negative.

## 2021-04-20 NOTE — PROGRESS NOTES
Telemedicine Visit: Established Patient     This encounter was conducted via Zoom.   Verbal consent was obtained. Patient's identity was verified.    Subjective:     Moris Moffett is a 40 y.o. male presenting for evaluation and management of fever.    Fever  Patient states that last Monday he developed pressure behind the eyes associated with a headache behind the eyes that may also have involved the frontal sinus.  Subjective fever started a few days after symptom onset, though the highest objective temperature he could get using home thermometer was 100 °F.  He has been taking Tylenol off-and-on.  His symptoms have been fairly consistent until today.  He has not yet had a fever today and is, overall, feeling quite well.  He denies associated ear pain, sore throat, chest pain, shortness of breath, cough or congestion.  Early on the course of the illness he did have some myalgias but he thinks it may be related to a game of pickle ball he played the Sunday preceding the illness.  Day 5 after symptoms began the patient was tested for Covid and was negative.      ROS see HPI    Allergies   Allergen Reactions   • Amoxicillin Hives       Current medicines (including changes today)  Current Outpatient Medications   Medication Sig Dispense Refill   • Cyanocobalamin (VITAMIN B-12) 1000 MCG Tab TAKE ONE TABLET BY MOUTH DAILY (VITAMIN B12) 100 Tab 4     No current facility-administered medications for this visit.       Patient Active Problem List    Diagnosis Date Noted   • Fever 04/20/2021   • Vitamin B12 deficiency 11/02/2018   • Healthcare maintenance 06/20/2018   • Morbid obesity with BMI of 40.0-44.9, adult ([facility]) 01/26/2016   • Heterozygous MTHFR mutation I9461G ([facility]) 05/02/2014   • Heterozygous MTHFR mutation C677T ([facility]) 05/02/2014       Family History   Problem Relation Age of Onset   • Cancer Mother         Breast   • Hypertension Mother    • Heart Disease Father         Heart Valve   • Cancer Maternal  Grandmother         Breast   • Cancer Maternal Aunt         Breast   • Diabetes Paternal Grandmother        He  has a past medical history of Obesity (BMI 35.0-39.9 without comorbidity).  He  has no past surgical history on file.       Objective:   Vitals obtained by patient:  Pulse 84   Temp 36.1 °C (96.9 °F) (Oral)   Ht 1.829 m (6') Comment: pt reported  Wt (!) 141 kg (310 lb) Comment: pt reported  SpO2 95%   BMI 42.04 kg/m²       Physical Exam:  Constitutional: Alert, no distress, well-groomed.  Eye: Round. Conjunctiva clear, lids normal. No icterus.   ENMT: Lips pink without lesions, good dentition, moist mucous membranes. Phonation normal.  Neck: No masses, no thyromegaly. Moves freely without pain. No nuchal rigidity.   Respiratory: Unlabored respiratory effort, no cough or audible wheeze.  Psych: normal affect.      Assessment and Plan:     1. Fever, unspecified fever cause  The patient appears well over the video.  I am reassured that his Covid test was negative.  This makes Covid highly unlikely.  Nonetheless, out of an abundance of caution, I recommended that the patient self quarantine at home for an additional 2 days and may return to the community as long as he remains afebrile for 24 hours. I suspect this is another viral illness and I am reassured that the patient seems to be doing well.  I recommended he start Flonase at night as a means of preventing bacterial sinusitis.  I informed him that his symptoms should only improve.  If they start to worsen or he develops new symptoms, I asked that he let me know.          Follow-up: Return if symptoms worsen or fail to improve.

## 2021-04-21 ENCOUNTER — PATIENT MESSAGE (OUTPATIENT)
Dept: MEDICAL GROUP | Facility: MEDICAL CENTER | Age: 41
End: 2021-04-21

## 2021-04-22 RX ORDER — DOXYCYCLINE HYCLATE 100 MG
100 TABLET ORAL 2 TIMES DAILY
Qty: 10 TABLET | Refills: 0 | Status: SHIPPED | OUTPATIENT
Start: 2021-04-22 | End: 2021-04-27

## 2021-06-29 RX ORDER — LANOLIN ALCOHOL/MO/W.PET/CERES
1000 CREAM (GRAM) TOPICAL DAILY
Qty: 100 TABLET | Refills: 4 | Status: SHIPPED | OUTPATIENT
Start: 2021-06-29 | End: 2021-10-04 | Stop reason: SDUPTHER

## 2021-06-30 ENCOUNTER — HOSPITAL ENCOUNTER (OUTPATIENT)
Dept: LAB | Facility: MEDICAL CENTER | Age: 41
End: 2021-06-30
Attending: FAMILY MEDICINE
Payer: COMMERCIAL

## 2021-06-30 DIAGNOSIS — Z00.00 HEALTHCARE MAINTENANCE: ICD-10-CM

## 2021-06-30 DIAGNOSIS — E53.8 VITAMIN B12 DEFICIENCY: ICD-10-CM

## 2021-06-30 LAB
CHOLEST SERPL-MCNC: 168 MG/DL (ref 100–199)
FASTING STATUS PATIENT QL REPORTED: NORMAL
GLUCOSE SERPL-MCNC: 91 MG/DL (ref 65–99)
HDLC SERPL-MCNC: 47 MG/DL
LDLC SERPL CALC-MCNC: 88 MG/DL
TRIGL SERPL-MCNC: 165 MG/DL (ref 0–149)
VIT B12 SERPL-MCNC: 680 PG/ML (ref 211–911)

## 2021-06-30 PROCEDURE — 36415 COLL VENOUS BLD VENIPUNCTURE: CPT

## 2021-06-30 PROCEDURE — 82607 VITAMIN B-12: CPT

## 2021-06-30 PROCEDURE — 80061 LIPID PANEL: CPT

## 2021-06-30 PROCEDURE — 82947 ASSAY GLUCOSE BLOOD QUANT: CPT

## 2022-03-06 ENCOUNTER — PATIENT MESSAGE (OUTPATIENT)
Dept: MEDICAL GROUP | Facility: MEDICAL CENTER | Age: 42
End: 2022-03-06
Payer: COMMERCIAL

## 2022-03-06 DIAGNOSIS — U07.1 COVID-19: ICD-10-CM

## 2022-03-06 RX ORDER — ALBUTEROL SULFATE 90 UG/1
1-2 AEROSOL, METERED RESPIRATORY (INHALATION) EVERY 4 HOURS PRN
Qty: 1 EACH | Refills: 0 | Status: SHIPPED | OUTPATIENT
Start: 2022-03-06

## 2022-03-06 RX ORDER — INHALER, ASSIST DEVICES
1 SPACER (EA) MISCELLANEOUS ONCE
Qty: 1 EACH | Refills: 0 | Status: SHIPPED | OUTPATIENT
Start: 2022-03-06 | End: 2022-03-06

## 2022-03-06 NOTE — PROGRESS NOTES
Received call from patient. On 2/21 he developed symptoms of COVID 19. Sx include myalgias along with fever (highest 102F) as well as cough, SOB and fatigue. He was tested for COVID last Tuesday and this was positive. He did a telemedicine visit with an outside provider three days ago and was prescribed ivermectin, azithomycin and methylprednisolone. He didn't take the methylprednisolone but is on ivermectin and azithromycin. Overall, his symptoms appear to be improving slightly and his fever resolved about two days ago. Vitals today done at home are T 99, P 88, /70 and SpO2 89-91%. He does not have any asthma or other lung disease but does likely have obesity hypoventilation syndrome. Last two SpO2 in clinic were  97% and 95% respectively. He has not been vaccinated against COVID 19. On the call, he didn't sound to be in any respiratory distress and there was only an occasional dry cough.     I informed the patient that the current evidence does not support the use of ivermectin and, considering the risks of the medicine, I advised him to stop this. I advised that he finish azithromycin to treat any concomitant CAP. I will prescribe albuterol and advised he use this TID scheduled for now. He is not a candidate for paxlovid or sotrovimab at this time.     I discussed ER precautions with the patient. If he develops acutely worsening symptoms or sudden SOB I advised him to go to the ER. I advised him and his wife to check his SpO2 regularly, including while he sleeps. If his oxygen is < 88% I advised him to go to the ER.     I advised them to reach out with any concerns.

## 2022-03-09 ENCOUNTER — TELEPHONE (OUTPATIENT)
Dept: MEDICAL GROUP | Facility: MEDICAL CENTER | Age: 42
End: 2022-03-09
Payer: COMMERCIAL

## 2022-03-09 NOTE — TELEPHONE ENCOUNTER
Patient didn't read [employer] message. Please relay the message below to this patient.     Yaw Laureano,      Enclosed are the CDC isolation and quarantine recommendations for people and their contacts with COVID 19. Please review this.      https://www.cdc.gov/coronavirus/2019-ncov/your-health/quarantine-isolation.html     How are you feeling? How is your oxygen level?     Take care,   Severiano

## 2022-03-09 NOTE — TELEPHONE ENCOUNTER
Phone Number Called: [phone] (home)     Call outcome:   LM to pt informing that Dr. Soares sent a mess via my chart but he has not read it yet. I asked that pt reads Dr. Soares's mess and respond back to him. Pt. May always give us a call.

## 2022-03-10 ENCOUNTER — HOSPITAL ENCOUNTER (OUTPATIENT)
Facility: MEDICAL CENTER | Age: 42
End: 2022-03-11
Attending: EMERGENCY MEDICINE | Admitting: INTERNAL MEDICINE
Payer: COMMERCIAL

## 2022-03-10 ENCOUNTER — APPOINTMENT (OUTPATIENT)
Dept: RADIOLOGY | Facility: MEDICAL CENTER | Age: 42
End: 2022-03-10
Attending: EMERGENCY MEDICINE
Payer: COMMERCIAL

## 2022-03-10 ENCOUNTER — TELEPHONE (OUTPATIENT)
Dept: MEDICAL GROUP | Facility: MEDICAL CENTER | Age: 42
End: 2022-03-10
Payer: COMMERCIAL

## 2022-03-10 DIAGNOSIS — U09.9 COVID-19 LONG HAULER: ICD-10-CM

## 2022-03-10 PROBLEM — J96.01 ACUTE RESPIRATORY FAILURE WITH HYPOXIA (HCC): Status: ACTIVE | Noted: 2022-03-10

## 2022-03-10 PROBLEM — D75.839 THROMBOCYTOSIS: Status: ACTIVE | Noted: 2022-03-10

## 2022-03-10 LAB
ALBUMIN SERPL BCP-MCNC: 3.5 G/DL (ref 3.2–4.9)
ALBUMIN/GLOB SERPL: 1.1 G/DL
ALP SERPL-CCNC: 71 U/L (ref 30–99)
ALT SERPL-CCNC: 73 U/L (ref 2–50)
ANION GAP SERPL CALC-SCNC: 11 MMOL/L (ref 7–16)
AST SERPL-CCNC: 50 U/L (ref 12–45)
BASOPHILS # BLD AUTO: 0.5 % (ref 0–1.8)
BASOPHILS # BLD: 0.04 K/UL (ref 0–0.12)
BILIRUB SERPL-MCNC: 0.5 MG/DL (ref 0.1–1.5)
BUN SERPL-MCNC: 10 MG/DL (ref 8–22)
CALCIUM SERPL-MCNC: 8.7 MG/DL (ref 8.4–10.2)
CHLORIDE SERPL-SCNC: 102 MMOL/L (ref 96–112)
CO2 SERPL-SCNC: 23 MMOL/L (ref 20–33)
COMMENT 1642: NORMAL
CREAT SERPL-MCNC: 0.8 MG/DL (ref 0.5–1.4)
D DIMER PPP IA.FEU-MCNC: 2.68 UG/ML (FEU) (ref 0–0.5)
EKG IMPRESSION: NORMAL
EOSINOPHIL # BLD AUTO: 0.09 K/UL (ref 0–0.51)
EOSINOPHIL NFR BLD: 1.1 % (ref 0–6.9)
ERYTHROCYTE [DISTWIDTH] IN BLOOD BY AUTOMATED COUNT: 40.2 FL (ref 35.9–50)
FLUAV RNA SPEC QL NAA+PROBE: NEGATIVE
FLUBV RNA SPEC QL NAA+PROBE: NEGATIVE
GLOBULIN SER CALC-MCNC: 3.2 G/DL (ref 1.9–3.5)
GLUCOSE SERPL-MCNC: 119 MG/DL (ref 65–99)
HCT VFR BLD AUTO: 42.5 % (ref 42–52)
HGB BLD-MCNC: 14.7 G/DL (ref 14–18)
IMM GRANULOCYTES # BLD AUTO: 0.08 K/UL (ref 0–0.11)
IMM GRANULOCYTES NFR BLD AUTO: 1 % (ref 0–0.9)
LG PLATELETS BLD QL SMEAR: NORMAL
LYMPHOCYTES # BLD AUTO: 2.48 K/UL (ref 1–4.8)
LYMPHOCYTES NFR BLD: 29.8 % (ref 22–41)
MCH RBC QN AUTO: 30.2 PG (ref 27–33)
MCHC RBC AUTO-ENTMCNC: 34.6 G/DL (ref 33.7–35.3)
MCV RBC AUTO: 87.3 FL (ref 81.4–97.8)
MONOCYTES # BLD AUTO: 0.64 K/UL (ref 0–0.85)
MONOCYTES NFR BLD AUTO: 7.7 % (ref 0–13.4)
NEUTROPHILS # BLD AUTO: 4.98 K/UL (ref 1.82–7.42)
NEUTROPHILS NFR BLD: 59.9 % (ref 44–72)
NRBC # BLD AUTO: 0 K/UL
NRBC BLD-RTO: 0 /100 WBC
NT-PROBNP SERPL IA-MCNC: 71 PG/ML (ref 0–125)
PLATELET # BLD AUTO: 490 K/UL (ref 164–446)
PLATELET BLD QL SMEAR: NORMAL
PMV BLD AUTO: 9.7 FL (ref 9–12.9)
POTASSIUM SERPL-SCNC: 3.6 MMOL/L (ref 3.6–5.5)
PROCALCITONIN SERPL-MCNC: 0.14 NG/ML
PROT SERPL-MCNC: 6.7 G/DL (ref 6–8.2)
RBC # BLD AUTO: 4.87 M/UL (ref 4.7–6.1)
RBC BLD AUTO: PRESENT
RSV RNA SPEC QL NAA+PROBE: NEGATIVE
SARS-COV-2 RNA RESP QL NAA+PROBE: NOTDETECTED
SODIUM SERPL-SCNC: 136 MMOL/L (ref 135–145)
SPECIMEN SOURCE: NORMAL
TROPONIN T SERPL-MCNC: 8 NG/L (ref 6–19)
VARIANT LYMPHS BLD QL SMEAR: NORMAL
WBC # BLD AUTO: 8.3 K/UL (ref 4.8–10.8)

## 2022-03-10 PROCEDURE — 71275 CT ANGIOGRAPHY CHEST: CPT

## 2022-03-10 PROCEDURE — A9270 NON-COVERED ITEM OR SERVICE: HCPCS | Performed by: EMERGENCY MEDICINE

## 2022-03-10 PROCEDURE — G0378 HOSPITAL OBSERVATION PER HR: HCPCS

## 2022-03-10 PROCEDURE — 84145 PROCALCITONIN (PCT): CPT

## 2022-03-10 PROCEDURE — 700111 HCHG RX REV CODE 636 W/ 250 OVERRIDE (IP): Performed by: INTERNAL MEDICINE

## 2022-03-10 PROCEDURE — 71045 X-RAY EXAM CHEST 1 VIEW: CPT

## 2022-03-10 PROCEDURE — C9803 HOPD COVID-19 SPEC COLLECT: HCPCS | Performed by: EMERGENCY MEDICINE

## 2022-03-10 PROCEDURE — 96374 THER/PROPH/DIAG INJ IV PUSH: CPT

## 2022-03-10 PROCEDURE — 80053 COMPREHEN METABOLIC PANEL: CPT

## 2022-03-10 PROCEDURE — 99220 PR INITIAL OBSERVATION CARE,LEVL III: CPT | Performed by: INTERNAL MEDICINE

## 2022-03-10 PROCEDURE — 85025 COMPLETE CBC W/AUTO DIFF WBC: CPT

## 2022-03-10 PROCEDURE — 83880 ASSAY OF NATRIURETIC PEPTIDE: CPT

## 2022-03-10 PROCEDURE — 93005 ELECTROCARDIOGRAM TRACING: CPT | Performed by: EMERGENCY MEDICINE

## 2022-03-10 PROCEDURE — 85379 FIBRIN DEGRADATION QUANT: CPT

## 2022-03-10 PROCEDURE — 96375 TX/PRO/DX INJ NEW DRUG ADDON: CPT

## 2022-03-10 PROCEDURE — 36415 COLL VENOUS BLD VENIPUNCTURE: CPT

## 2022-03-10 PROCEDURE — 700117 HCHG RX CONTRAST REV CODE 255: Performed by: EMERGENCY MEDICINE

## 2022-03-10 PROCEDURE — 84484 ASSAY OF TROPONIN QUANT: CPT

## 2022-03-10 PROCEDURE — 0241U HCHG SARS-COV-2 COVID-19 NFCT DS RESP RNA 4 TRGT MIC: CPT

## 2022-03-10 PROCEDURE — 700102 HCHG RX REV CODE 250 W/ 637 OVERRIDE(OP): Performed by: EMERGENCY MEDICINE

## 2022-03-10 PROCEDURE — 99285 EMERGENCY DEPT VISIT HI MDM: CPT

## 2022-03-10 RX ORDER — PROMETHAZINE HYDROCHLORIDE 25 MG/1
12.5-25 TABLET ORAL EVERY 4 HOURS PRN
Status: DISCONTINUED | OUTPATIENT
Start: 2022-03-10 | End: 2022-03-11 | Stop reason: HOSPADM

## 2022-03-10 RX ORDER — ALBUTEROL SULFATE 90 UG/1
1-2 AEROSOL, METERED RESPIRATORY (INHALATION) EVERY 4 HOURS PRN
Status: DISCONTINUED | OUTPATIENT
Start: 2022-03-10 | End: 2022-03-11 | Stop reason: HOSPADM

## 2022-03-10 RX ORDER — AMOXICILLIN 250 MG
2 CAPSULE ORAL 2 TIMES DAILY
Status: DISCONTINUED | OUTPATIENT
Start: 2022-03-10 | End: 2022-03-11 | Stop reason: HOSPADM

## 2022-03-10 RX ORDER — ONDANSETRON 4 MG/1
4 TABLET, ORALLY DISINTEGRATING ORAL EVERY 4 HOURS PRN
Status: DISCONTINUED | OUTPATIENT
Start: 2022-03-10 | End: 2022-03-11 | Stop reason: HOSPADM

## 2022-03-10 RX ORDER — PROCHLORPERAZINE EDISYLATE 5 MG/ML
5-10 INJECTION INTRAMUSCULAR; INTRAVENOUS EVERY 4 HOURS PRN
Status: DISCONTINUED | OUTPATIENT
Start: 2022-03-10 | End: 2022-03-11 | Stop reason: HOSPADM

## 2022-03-10 RX ORDER — BISACODYL 10 MG
10 SUPPOSITORY, RECTAL RECTAL
Status: DISCONTINUED | OUTPATIENT
Start: 2022-03-10 | End: 2022-03-11 | Stop reason: HOSPADM

## 2022-03-10 RX ORDER — FUROSEMIDE 10 MG/ML
40 INJECTION INTRAMUSCULAR; INTRAVENOUS
Status: DISCONTINUED | OUTPATIENT
Start: 2022-03-10 | End: 2022-03-11 | Stop reason: HOSPADM

## 2022-03-10 RX ORDER — PROMETHAZINE HYDROCHLORIDE 25 MG/1
12.5-25 SUPPOSITORY RECTAL EVERY 4 HOURS PRN
Status: DISCONTINUED | OUTPATIENT
Start: 2022-03-10 | End: 2022-03-11 | Stop reason: HOSPADM

## 2022-03-10 RX ORDER — ACETAMINOPHEN 325 MG/1
650 TABLET ORAL EVERY 6 HOURS PRN
Status: DISCONTINUED | OUTPATIENT
Start: 2022-03-10 | End: 2022-03-11 | Stop reason: HOSPADM

## 2022-03-10 RX ORDER — ONDANSETRON 2 MG/ML
4 INJECTION INTRAMUSCULAR; INTRAVENOUS EVERY 4 HOURS PRN
Status: DISCONTINUED | OUTPATIENT
Start: 2022-03-10 | End: 2022-03-11 | Stop reason: HOSPADM

## 2022-03-10 RX ORDER — POLYETHYLENE GLYCOL 3350 17 G/17G
1 POWDER, FOR SOLUTION ORAL
Status: DISCONTINUED | OUTPATIENT
Start: 2022-03-10 | End: 2022-03-11 | Stop reason: HOSPADM

## 2022-03-10 RX ORDER — DEXAMETHASONE 4 MG/1
6 TABLET ORAL ONCE
Status: COMPLETED | OUTPATIENT
Start: 2022-03-10 | End: 2022-03-10

## 2022-03-10 RX ADMIN — IOHEXOL 75 ML: 350 INJECTION, SOLUTION INTRAVENOUS at 20:19

## 2022-03-10 RX ADMIN — FUROSEMIDE 40 MG: 10 INJECTION, SOLUTION INTRAVENOUS at 22:43

## 2022-03-10 RX ADMIN — DEXAMETHASONE 6 MG: 4 TABLET ORAL at 18:59

## 2022-03-10 ASSESSMENT — COGNITIVE AND FUNCTIONAL STATUS - GENERAL
SUGGESTED CMS G CODE MODIFIER DAILY ACTIVITY: CH
MOBILITY SCORE: 24
SUGGESTED CMS G CODE MODIFIER MOBILITY: CH
DAILY ACTIVITIY SCORE: 24
MOBILITY SCORE: 24
SUGGESTED CMS G CODE MODIFIER MOBILITY: CH

## 2022-03-10 ASSESSMENT — ENCOUNTER SYMPTOMS
FALLS: 0
BRUISES/BLEEDS EASILY: 0
DIARRHEA: 1
HALLUCINATIONS: 0
PALPITATIONS: 0
VOMITING: 0
LOSS OF CONSCIOUSNESS: 0
SHORTNESS OF BREATH: 1
MYALGIAS: 0
CHILLS: 0
NAUSEA: 0
SORE THROAT: 0
ABDOMINAL PAIN: 0
BLURRED VISION: 0
COUGH: 1
FEVER: 0
SEIZURES: 0
DOUBLE VISION: 0

## 2022-03-10 ASSESSMENT — LIFESTYLE VARIABLES
HAVE PEOPLE ANNOYED YOU BY CRITICIZING YOUR DRINKING: NO
EVER HAD A DRINK FIRST THING IN THE MORNING TO STEADY YOUR NERVES TO GET RID OF A HANGOVER: NO
CONSUMPTION TOTAL: NEGATIVE
SUBSTANCE_ABUSE: 0
AVERAGE NUMBER OF DAYS PER WEEK YOU HAVE A DRINK CONTAINING ALCOHOL: 0
HAVE YOU EVER FELT YOU SHOULD CUT DOWN ON YOUR DRINKING: NO
TOTAL SCORE: 0
EVER FELT BAD OR GUILTY ABOUT YOUR DRINKING: NO
TOTAL SCORE: 0
ON A TYPICAL DAY WHEN YOU DRINK ALCOHOL HOW MANY DRINKS DO YOU HAVE: 0
TOTAL SCORE: 0
ALCOHOL_USE: YES
HOW MANY TIMES IN THE PAST YEAR HAVE YOU HAD 5 OR MORE DRINKS IN A DAY: 0

## 2022-03-10 ASSESSMENT — PATIENT HEALTH QUESTIONNAIRE - PHQ9
SUM OF ALL RESPONSES TO PHQ9 QUESTIONS 1 AND 2: 0
1. LITTLE INTEREST OR PLEASURE IN DOING THINGS: NOT AT ALL
2. FEELING DOWN, DEPRESSED, IRRITABLE, OR HOPELESS: NOT AT ALL

## 2022-03-10 NOTE — TELEPHONE ENCOUNTER
Phone Number Called: [phone] (home)     Call outcome:   LM to pt informing that Dr. Soares sent a mess via my chart but he has not read it yet. I asked that pt reads Dr. Soares's mess and respond back to him. Pt. May always give us a call.

## 2022-03-11 VITALS
TEMPERATURE: 98.7 F | OXYGEN SATURATION: 90 % | RESPIRATION RATE: 16 BRPM | SYSTOLIC BLOOD PRESSURE: 118 MMHG | HEART RATE: 88 BPM | HEIGHT: 72 IN | DIASTOLIC BLOOD PRESSURE: 81 MMHG | BODY MASS INDEX: 41.24 KG/M2 | WEIGHT: 304.45 LBS

## 2022-03-11 PROCEDURE — G0378 HOSPITAL OBSERVATION PER HR: HCPCS

## 2022-03-11 PROCEDURE — 99217 PR OBSERVATION CARE DISCHARGE: CPT | Performed by: HOSPITALIST

## 2022-03-11 RX ORDER — MULTIVIT-MIN/IRON/FOLIC ACID/K 18-600-40
2000 CAPSULE ORAL DAILY
Qty: 30 CAPSULE | Refills: 3 | Status: SHIPPED | OUTPATIENT
Start: 2022-03-11

## 2022-03-11 RX ORDER — ZINC SULFATE 50(220)MG
220 CAPSULE ORAL DAILY
Qty: 10 CAPSULE | Refills: 0 | Status: SHIPPED
Start: 2022-03-11 | End: 2022-03-21

## 2022-03-11 RX ORDER — DEXAMETHASONE 6 MG/1
6 TABLET ORAL DAILY
Qty: 10 TABLET | Refills: 0 | Status: SHIPPED
Start: 2022-03-11 | End: 2022-03-21

## 2022-03-11 ASSESSMENT — PAIN DESCRIPTION - PAIN TYPE: TYPE: ACUTE PAIN

## 2022-03-11 NOTE — DISCHARGE PLANNING
Anticipated Discharge Disposition:   Home with O2    Action:   Chart review complete     Discussed patient during rounds. Per MD patient will most likely require O2. Pending walking O2 eval and order to be placed.     Anticipate discharge today.    RN CM will continue to follow.     1155: Spoke to patients wife to collect choice for O2.  Choice collected for [facility] and faxed to [facility].     1530: RN CM called [facility] to follow up with O2. Representative states there is one more review in front of this patient.     Barriers to Discharge:   DME delivery      Plan:   HCM will continue to follow and assist with discharge needs.

## 2022-03-11 NOTE — TELEPHONE ENCOUNTER
Patient has COVID, symptoms began 2/21. He is unvaccinated. Spoke with him today by phone. He feels well at rest but with activity develops dizziness with SpO2 into the 70s. He feels dehydrated. I recommended he proceed to the ER for further evaluation. He would benefit from labs and CXR. May require supplemental oxygen. His wife will bring him to the ER.

## 2022-03-11 NOTE — CARE PLAN
The patient is Stable - Low risk of patient condition declining or worsening    Shift Goals  Clinical Goals: maintain O2 sats above 90%  Patient Goals: sleep    Progress made toward(s) clinical / shift goals:  Pt's O2 sats stable above 90%, has been sleeping since admit    Patient is not progressing towards the following goals:       room air

## 2022-03-11 NOTE — FACE TO FACE
Face to Face Note  -  Durable Medical Equipment    Elsi Su M.D. - NPI: [id number]  I certify that this patient is under my care and that they had a durable medical equipment(DME)face to face encounter by myself that meets the physician DME face-to-face encounter requirements with this patient on:    Date of encounter:   Patient:                    MRN:                       YOB: 2023  Moris Moffett  [id number]  1980     The encounter with the patient was in whole, or in part, for the following medical condition, which is the primary reason for durable medical equipment:  Covid-19 Infection    I certify that, based on my findings, the following durable medical equipment is medically necessary:  Oxygen.    HOME O2 Saturation Measurements:(Values must be present for Home Oxygen orders)  Room air sat at rest: 88  Room air sat with amb: 85  With liters of O2: 4, O2 sat at rest with O2: 91  With Liters of O2: 4, O2 sat with amb with O2 : 90  Is the patient mobile?: Yes    My Clinical findings support the need for the above equipment due to:  Hypoxia    Supporting Symptoms: hypoxia on room air as measured    If patient feels more short of breath, they can go up to 6 liters per minute and contact healthcare provider.

## 2022-03-11 NOTE — PROGRESS NOTES
Received report from DAVID Davenport.  Assumed Pt. Care  Pt. A&Ox4  No sign of cardiac and respiratory distress.  Denies any pain.  Pt. Is in bed just finished eating breakfast.  Bed at lowest position.  Call light and personal belonging within reach.   Encourage to call for assistance.

## 2022-03-11 NOTE — ED NOTES
"Spoke w/ pt using [employer]  from [employer]. Called c/o \"my body feeling tense\" and \"I think I have a high fever\". T 99.5 orally. Reports shortness of breath starting yesterday.Handed phone to daughter after 2-3 min; dtr says talking makes pt too tired. Advised ED now.     Reason for Disposition    [1] MODERATE difficulty breathing (e.g., speaks in phrases, SOB even at rest, pulse 100-120) AND [2] NEW-onset or WORSE than normal    Additional Information    Negative: [1] Breathing stopped AND [2] hasn't returned    Negative: Choking on something    Negative: Severe difficulty breathing (e.g., struggling for each breath, speaks in single words)    Negative: Bluish (or gray) lips or face now    Negative: Difficult to awaken or acting confused (e.g., disoriented, slurred speech)    Negative: Passed out (i.e., lost consciousness, collapsed and was not responding)    Negative: Wheezing started suddenly after medicine, an allergic food or bee sting    Negative: Stridor    Negative: Slow, shallow and weak breathing    Negative: Sounds like a life-threatening emergency to the triager    Negative: Chest pain    Negative: [1] Wheezing (high pitched whistling sound) AND [2] previous asthma attacks or use of asthma medicines    Negative: [1] Difficulty breathing AND [2] only present when coughing    Negative: [1] Difficulty breathing AND [2] only from stuffy or runny nose    Protocols used: BREATHING DIFFICULTY-A-AH      " Xray completed  Pt tolerated PO meds  Wife at bedside    Pt resting on gurney, pt in no acute distress, pt provided call light, instructed to call if needing any assistance, instructed not to get up by self, [email or web address] in lowest position.

## 2022-03-11 NOTE — ASSESSMENT & PLAN NOTE
Likely sequela of post COVID lung injury  spO2 in the 70's on room air on home pulse ox  Tested positive on COVID home antigen test 02/27  Negative COVID PCR today  CXR with bilateral opacities consistent with COVID  CTA negative for PE  No leukocytosis, afebrile, negative procalcitonin  Lasix 40mg QD  Decadron 6mg  Home O2 in AM

## 2022-03-11 NOTE — PROGRESS NOTES
4 Eyes Skin Assessment Completed by DAVID Toth and DAVID Pereira.    Head WDL  Ears WDL  Nose WDL  Mouth WDL  Neck WDL  Breast/Chest WDL  Shoulder Blades WDL  Spine WDL  (R) Arm/Elbow/Hand WDL 20G IV Right AC  (L) Arm/Elbow/Hand WDL  Abdomen WDL  Groin WDL  Scrotum/Coccyx/Buttocks WDL  (R) Leg WDL  (L) Leg WDL  (R) Heel/Foot/Toe WDL  (L) Heel/Foot/Toe WDL          Devices In Places Pulse Ox      Interventions In Place Pillows and Pressure Redistribution Mattress    Possible Skin Injury No    Pictures Uploaded Into Epic N/A  Wound Consult Placed N/A  RN Wound Prevention Protocol Ordered No

## 2022-03-11 NOTE — ED NOTES
"Pt back to room  Chief Complaint   Patient presents with   • Shortness of Breath     Reports recent COVID+ test, feels SOB on exertion and \"dizzy when I'm up and around\". Denies CP.        "

## 2022-03-11 NOTE — H&P
"Hospital Medicine History & Physical Note    Date of Service  3/10/2022    Primary Care Physician  Kirt Soares M.D.    Code Status  Full Code    Chief Complaint  Chief Complaint   Patient presents with   • Shortness of Breath     Reports recent COVID+ test, feels SOB on exertion and \"dizzy when I'm up and around\". Denies CP.        History of Presenting Illness  Moris Moffett is a 41 y.o. male who presented 3/10/2022 with hypoxia.  Patient reports testing positive on a home antigen Covid test on 02/27.  Patient was prescribed a Z-Juan Carlos via telemedicine visit and has been monitoring his home oxygen with a pulse ox.  Patient has noticed decreased oxygen saturations over the last several days, most notably with minimal exertion.  Patient states that he has been able to recover to appropriate oxygenation levels with rest.  Today patient states that he found himself to be with SPO2 in the 70s and presented to emergency department.  Upon evaluation in the ED.  Chest x-ray revealed bilateral consolidations consistent with Covid.  Dimer was elevated at 2.68 and subsequent CTA chest was negative for PE.  No leukocytosis, afebrile, negative procalcitonin.  Patient was given steroids and admitted for further treatment and work-up.    Review of Systems  Review of Systems   Constitutional: Negative for chills and fever.   HENT: Positive for congestion. Negative for sore throat.    Eyes: Negative for blurred vision and double vision.   Respiratory: Positive for cough and shortness of breath.    Cardiovascular: Negative for chest pain and palpitations.   Gastrointestinal: Positive for diarrhea. Negative for abdominal pain, nausea and vomiting.   Genitourinary: Negative for dysuria and frequency.   Musculoskeletal: Negative for falls and myalgias.   Skin: Negative for rash.   Neurological: Negative for seizures and loss of consciousness.   Endo/Heme/Allergies: Does not bruise/bleed easily.   Psychiatric/Behavioral: " Negative for hallucinations and substance abuse.       Past Medical History   has a past medical history of Obesity (BMI 35.0-39.9 without comorbidity).    Surgical History   has no past surgical history on file.     Family History  family history includes Cancer in his maternal aunt, maternal grandmother, and mother; Diabetes in his paternal grandmother; Heart Disease in his father; Hypertension in his mother.   Family history reviewed with patient. There is no family history that is pertinent to the chief complaint.     Social History   reports that he has never smoked. He has never used smokeless tobacco. He reports current alcohol use. He reports that he does not use drugs.    Allergies  Allergies   Allergen Reactions   • Amoxicillin Hives     Hives when he was 9 years old       Medications  Prior to Admission Medications   Prescriptions Last Dose Informant Patient Reported? Taking?   Cyanocobalamin (VITAMIN B-12) 1000 MCG Tab UNK at UNK  No No   Sig: Take 1 Tablet by mouth every day.   albuterol 108 (90 Base) MCG/ACT Aero Soln inhalation aerosol PRN at PRN  No No   Sig: Inhale 1-2 Puffs every four hours as needed for Shortness of Breath. Use with spacer.      Facility-Administered Medications: None       Physical Exam  Temp:  [36.6 °C (97.8 °F)] 36.6 °C (97.8 °F)  Pulse:  [73-95] 73  Resp:  [20] 20  BP: (137-162)/(71-92) 137/71  SpO2:  [86 %-95 %] 92 %  Blood Pressure: 137/71   Temperature: 36.6 °C (97.8 °F)   Pulse: 73   Respiration: 20   Pulse Oximetry: 92 %       Physical Exam  Vitals and nursing note reviewed.   Constitutional:       General: He is not in acute distress.     Appearance: He is obese. He is not toxic-appearing.      Comments: Pleasant, conversational   HENT:      Head: Normocephalic and atraumatic.      Right Ear: External ear normal.      Left Ear: External ear normal.      Nose: No congestion.      Mouth/Throat:      Mouth: Mucous membranes are moist.      Pharynx: No oropharyngeal  exudate.   Eyes:      General: No scleral icterus.     Extraocular Movements: Extraocular movements intact.      Pupils: Pupils are equal, round, and reactive to light.   Cardiovascular:      Rate and Rhythm: Normal rate and regular rhythm.      Heart sounds: No murmur heard.  Pulmonary:      Breath sounds: No wheezing.      Comments: Crackles auscultated in lung bases bilaterally  Abdominal:      General: Bowel sounds are normal.      Palpations: Abdomen is soft.      Tenderness: There is no abdominal tenderness. There is no guarding or rebound.   Musculoskeletal:         General: No swelling or deformity.   Skin:     Capillary Refill: Capillary refill takes less than 2 seconds.      Coloration: Skin is not jaundiced.      Findings: No bruising.   Neurological:      General: No focal deficit present.      Mental Status: He is alert. Mental status is at baseline.      Cranial Nerves: No cranial nerve deficit.      Comments: A&O x4   Psychiatric:         Mood and Affect: Mood normal.         Behavior: Behavior normal.         Thought Content: Thought content normal.         Judgment: Judgment normal.         Laboratory:  Recent Labs     03/10/22  1852   WBC 8.3   RBC 4.87   HEMOGLOBIN 14.7   HEMATOCRIT 42.5   MCV 87.3   MCH 30.2   MCHC 34.6   RDW 40.2   PLATELETCT 490*   MPV 9.7     Recent Labs     03/10/22  1852   SODIUM 136   POTASSIUM 3.6   CHLORIDE 102   CO2 23   GLUCOSE 119*   BUN 10   CREATININE 0.80   CALCIUM 8.7     Recent Labs     03/10/22  1852   ALTSGPT 73*   ASTSGOT 50*   ALKPHOSPHAT 71   TBILIRUBIN 0.5   GLUCOSE 119*         No results for input(s): NTPROBNP in the last 72 hours.      Recent Labs     03/10/22  1852   TROPONINT 8       Imaging:  CT-CTA CHEST PULMONARY ARTERY W/ RECONS   Final Result      1.  No central or segmental pulmonary embolus   2.  BILATERAL airspace disease likely pneumonia. Covid pneumonia is a consideration.   3.  Enlarged hilar and mediastinal lymph nodes             DX-CHEST-PORTABLE (1 VIEW)   Final Result      Bilateral peripheral opacities, especially in the mid/lower lungs, sequela of active or recent pneumonia. COVID pneumonia is high in the differential.             X-Ray:  I have personally reviewed the images and compared with prior images.  EKG:  I have personally reviewed the images and compared with prior images.    Assessment/Plan:  I anticipate this patient is appropriate for observation status at this time.    * Acute respiratory failure with hypoxia (HCC)- (present on admission)  Assessment & Plan  Likely sequela of post COVID lung injury  spO2 in the 70's on room air on home pulse ox  Tested positive on COVID home antigen test 02/27  Negative COVID PCR today  CXR with bilateral opacities consistent with COVID  CTA negative for PE  No leukocytosis, afebrile, negative procalcitonin  Lasix 40mg QD  Decadron 6mg  Home O2 in AM    Thrombocytosis  Assessment & Plan  Plt 490   Likely reactive    Vitamin B12 deficiency- (present on admission)  Assessment & Plan  Home meds include daily replacement    Morbid obesity with BMI of 40.0-44.9, adult (HCC)- (present on admission)  Assessment & Plan  Due to excess caloric intake      VTE prophylaxis: SCDs/TEDs

## 2022-03-11 NOTE — ED NOTES
Negative COVID test today  + COVID test 2/27  Denies fever x 72 hours    Reports SOB only with exertion, can tolerate laying down  94% while on 2L NC

## 2022-03-11 NOTE — DISCHARGE PLANNING
Received Choice form at 1156  Agency/Facility Name: [facility]  Referral sent per Choice form @ 1205 0145  Agency/Facility Name: [facility]  Spoke To: leopoldo  Outcome: per leopoldo referral received and in review      [id number]  Agency/Facility Name: [facility]  Spoke To: Juan Manuel  Outcome: Per genaro they reviewed the referral and ETA 1 hour

## 2022-03-11 NOTE — DISCHARGE PLANNING
LSW received FMLA paperwork from pt. LSW notified pt it would take at least 8-10 days. LSW emailed paperwork to Sania Kendrick.

## 2022-03-11 NOTE — CARE PLAN
The patient is Stable - Low risk of patient condition declining or worsening    Shift Goals  Clinical Goals: Dc plan  Patient Goals: Use of IS    Progress made toward(s) clinical / shift goals:  DC plan    Patient is not progressing towards the following goals: None      Problem: Knowledge Deficit - Standard  Goal: Patient and family/care givers will demonstrate understanding of plan of care, disease process/condition, diagnostic tests and medications  Outcome: Met     Problem: Pain - Standard  Goal: Alleviation of pain or a reduction in pain to the patient’s comfort goal  Outcome: Met

## 2022-03-11 NOTE — DISCHARGE SUMMARY
"Discharge Summary    CHIEF COMPLAINT ON ADMISSION  Chief Complaint   Patient presents with   • Shortness of Breath     Reports recent COVID+ test, feels SOB on exertion and \"dizzy when I'm up and around\". Denies CP.        Reason for Admission  Sent by MD; dizziness     Admission Date  3/10/2022    CODE STATUS  Full Code    HPI & HOSPITAL COURSE  Mr Moris Moffett is a 41 year old gentleman who comes in with shortness of breath. He was positive on 2/27/22 for COVID 19 infection. Has worsened recently with oxygen status and dropped down to 70% on room air. He was stabilized on 4 lites of oxygen by nasal cannula and has been set up for oxygen for home use.  Patient at this point has been placed on Decadron, vitamin C, vitamin D and zinc.  Patient at this point is not considered contagious.  Patient can discharge home with outpatient follow-ups and monitoring.  Course of treatment explained to patient and wife at bedside.  All questions were answered.  Patient is alert awake oriented x3 pleasant cooperative gentleman understands his discharge plan instructions.      Therefore, he is discharged in good and stable condition to home with close outpatient follow-up.    The patient recovered much more quickly than anticipated on admission.    Discharge Date  3/11/2022    FOLLOW UP ITEMS POST DISCHARGE  Follow-up with the primary care physician in 7 to 10 days    DISCHARGE DIAGNOSES  Principal Problem:    Acute respiratory failure with hypoxia (HCC) POA: Yes  Active Problems:    Morbid obesity with BMI of 40.0-44.9, adult (HCC) POA: Yes    Vitamin B12 deficiency POA: Yes    Thrombocytosis POA: Unknown  Resolved Problems:    * No resolved hospital problems. *      FOLLOW UP  Future Appointments   Date Time Provider Department Center   3/11/2022  8:30 AM Kirt Soares M.D. [facility]     No follow-up provider specified.    MEDICATIONS ON DISCHARGE     Medication List      START taking these medications      " Instructions   dexamethasone 6 MG Tabs  Commonly known as: Decadron   Take 1 Tablet by mouth every day for 10 days.  Dose: 6 mg     vitamin C 500 MG Chew   Chew 1 Tablet every day for 10 days.  Dose: 500 mg     Vitamin D 50 MCG (2000 UT) Caps   Take 1 Capsule by mouth every day.  Dose: 2,000 Units     zinc sulfate 220 (50 Zn) MG Caps  Commonly known as: ZINCATE   Take 1 Capsule by mouth every day for 10 days.  Dose: 220 mg        CONTINUE taking these medications      Instructions   albuterol 108 (90 Base) MCG/ACT Aers inhalation aerosol   Doctor's comments: Give albuterol that is patient or insurance preference please  Inhale 1-2 Puffs every four hours as needed for Shortness of Breath. Use with spacer.  Dose: 1-2 Puff     vitamin B-12 1000 MCG Tabs   Take 1 Tablet by mouth every day.  Dose: 1,000 mcg            Allergies  Allergies   Allergen Reactions   • Amoxicillin Hives     Hives when he was 9 years old       DIET  Orders Placed This Encounter   Procedures   • Diet Order Diet: Regular     Standing Status:   Standing     Number of Occurrences:   1     Order Specific Question:   Diet:     Answer:   Regular [1]       ACTIVITY  As tolerated.  Weight bearing as tolerated    CONSULTATIONS  None    PROCEDURES  None    LABORATORY  Lab Results   Component Value Date    SODIUM 136 03/10/2022    POTASSIUM 3.6 03/10/2022    CHLORIDE 102 03/10/2022    CO2 23 03/10/2022    GLUCOSE 119 (H) 03/10/2022    BUN 10 03/10/2022    CREATININE 0.80 03/10/2022        Lab Results   Component Value Date    WBC 8.3 03/10/2022    HEMOGLOBIN 14.7 03/10/2022    HEMATOCRIT 42.5 03/10/2022    PLATELETCT 490 (H) 03/10/2022        Total time of the discharge process exceeds 32 minutes.

## 2022-03-11 NOTE — ED NOTES
Med Rec completed per patient interview  Allergies reviewed    Completed 5 day course of azithromycin in last 30 days  Completed 10 days of Vit D, Zinc, Quercetin, NAC, SPM synergy, curcumin supplements     Ok per patient to discuss medications with visitor present  home pharmacy: [facility] JENNI Garcia

## 2022-03-12 NOTE — DISCHARGE INSTRUCTIONS
Discharge Instructions    Discharged to home by car with relative. Discharged via wheelchair, hospital escort: Yes.  Special equipment needed: Oxygen    Be sure to schedule a follow-up appointment with your primary care doctor or any specialists as instructed.     Discharge Plan:   Influenza Vaccine Indication: Patient Refuses    I understand that a diet low in cholesterol, fat, and sodium is recommended for good health. Unless I have been given specific instructions below for another diet, I accept this instruction as my diet prescription.   Other diet: Regular    Special Instructions: None    · Is patient discharged on Warfarin / Coumadin?   No     Acute Respiratory Failure, Adult    Acute respiratory failure occurs when there is not enough oxygen passing from your lungs to your body. When this happens, your lungs have trouble removing carbon dioxide from the blood. This causes your blood oxygen level to drop too low as carbon dioxide builds up.  Acute respiratory failure is a medical emergency. It can develop quickly, but it is temporary if treated promptly. Your lung capacity, or how much air your lungs can hold, may improve with time, exercise, and treatment.  What are the causes?  There are many possible causes of acute respiratory failure, including:  · Lung injury.  · Chest injury or damage to the ribs or tissues near the lungs.  · Lung conditions that affect the flow of air and blood into and out of the lungs, such as pneumonia, acute respiratory distress syndrome, and cystic fibrosis.  · Medical conditions, such as strokes or spinal cord injuries, that affect the muscles and nerves that control breathing.  · Blood infection (sepsis).  · Inflammation of the pancreas (pancreatitis).  · A blood clot in the lungs (pulmonary embolism).  · A large-volume blood transfusion.  · Burns.  · Near-drowning.  · Seizure.  · Smoke inhalation.  · Reaction to medicines.  · Alcohol or drug overdose.  What increases the  risk?  This condition is more likely to develop in people who have:  · A blocked airway.  · Asthma.  · A condition or disease that damages or weakens the muscles, nerves, bones, or tissues that are involved in breathing.  · A serious infection.  · A health problem that blocks the unconscious reflex that is involved in breathing, such as hypothyroidism or sleep apnea.  · A lung injury or trauma.  What are the signs or symptoms?  Trouble breathing is the main symptom of acute respiratory failure. Symptoms may also include:  · Rapid breathing.  · Restlessness or anxiety.  · Skin, lips, or fingernails that appear blue (cyanosis).  · Rapid heart rate.  · Abnormal heart rhythms (arrhythmias).  · Confusion or changes in behavior.  · Tiredness or loss of energy.  · Feeling sleepy or having a loss of consciousness.  How is this diagnosed?  Your health care provider can diagnose acute respiratory failure with a medical history and physical exam. During the exam, your health care provider will listen to your heart and check for crackling or wheezing sounds in your lungs. Your may also have tests to confirm the diagnosis and determine what is causing respiratory failure. These tests may include:  · Measuring the amount of oxygen in your blood (pulse oximetry). The measurement comes from a small device that is placed on your finger, earlobe, or toe.  · Other blood tests to measure blood gases and to look for signs of infection.  · Sampling your cerebral spinal fluid or tracheal fluid to check for infections.  · Chest X-ray to look for fluid in spaces that should be filled with air.  · Electrocardiogram (ECG) to look at the heart's electrical activity.  How is this treated?  Treatment for this condition usually takes places in a hospital intensive care unit (ICU). Treatment depends on what is causing the condition. It may include one or more treatments until your symptoms improve. Treatment may include:  · Supplemental oxygen.  Extra oxygen is given through a tube in the nose, a face mask, or a sears.  · A device such as a continuous positive airway pressure (CPAP) or bi-level positive airway pressure (BiPAP or BPAP) machine. This treatment uses mild air pressure to keep the airways open. A mask or other device will be placed over your nose or mouth. A tube that is connected to a motor will deliver oxygen through the mask.  · Ventilator. This treatment helps move air into and out of the lungs. This may be done with a bag and mask or a machine. For this treatment, a tube is placed in your windpipe (trachea) so air and oxygen can flow to the lungs.  · Extracorporeal membrane oxygenation (ECMO). This treatment temporarily takes over the function of the heart and lungs, supplying oxygen and removing carbon dioxide. ECMO gives the lungs a chance to recover. It may be used if a ventilator is not effective.  · Tracheostomy. This is a procedure that creates a hole in the neck to insert a breathing tube.  · Receiving fluids and medicines.  · Rocking the bed to help breathing.  Follow these instructions at home:  · Take over-the-counter and prescription medicines only as told by your health care provider.  · Return to normal activities as told by your health care provider. Ask your health care provider what activities are safe for you.  · Keep all follow-up visits as told by your health care provider. This is important.  How is this prevented?  Treating infections and medical conditions that may lead to acute respiratory failure can help prevent the condition from developing.  Contact a health care provider if:  · You have a fever.  · Your symptoms do not improve or they get worse.  Get help right away if:  · You are having trouble breathing.  · You lose consciousness.  · Your have cyanosis or turn blue.  · You develop a rapid heart rate.  · You are confused.  These symptoms may represent a serious problem that is an emergency. Do not wait to see if  the symptoms will go away. Get medical help right away. Call your local emergency services (911 in the [place] Do not drive yourself to the hospital.  This information is not intended to replace advice given to you by your health care provider. Make sure you discuss any questions you have with your health care provider.  Document Released: 12/23/2014 Document Revised: 11/30/2018 Document Reviewed: 07/05/2017  [employer] Patient Education © 2020 [employer]      Hypoxia  Hypoxia is a condition that happens when there is a lack of oxygen in the body's tissues and organs. When there is not enough oxygen, organs cannot work as they should. This causes serious problems throughout the body and in the brain.  What are the causes?  This condition may be caused by:  · Exposure to high altitude.  · A collapsed lung (pneumothorax).  · Lung infection (pneumonia).  · Lung injury.  · Long-term (chronic) lung disease, such as COPD (chronic obstructive pulmonary disease).  · Blood collecting in the chest cavity (hemothorax).  · Food, saliva, or vomit getting into the airway (aspiration).  · Reduced blood flow (ischemia).  · Severe blood loss.  · Slow or shallow breathing (hypoventilation).  · Blood disorders, such as anemia.  · Carbon monoxide poisoning.  · The heart suddenly stopping (cardiac arrest).  · Anesthetic medicines.  · Drowning.  · Choking.  What are the signs or symptoms?  Symptoms of this condition include:  · Headache.  · Fatigue.  · Drowsiness.  · Forgetfulness.  · Nausea.  · Confusion.  · Shortness of breath.  · Dizziness.  · Bluish color of the skin, lips, or nail beds (cyanosis).  · Change in consciousness or awareness.  If hypoxia is not treated, it can lead to convulsions, loss of consciousness (coma), or brain damage.  How is this diagnosed?  This condition may be diagnosed based on:  · A physical exam.  · Blood tests.  · A test that measures how much oxygen is in your blood (pulse oximetry). This is done with a  sensor that is placed on your finger, toe, or earlobe.  · Chest X-ray.  · Tests to check your lung function (pulmonary function tests).  · A test to check the electrical activity of your heart (electrocardiogram, ECG).  You may have other tests to determine the cause of your hypoxia.  How is this treated?    Treatment for this condition depends on what is causing the hypoxia. You will likely be treated with oxygen therapy. This may be done by giving you oxygen through a face mask or through tubes in your nose.  Your health care provider may also recommend other therapies to treat the underlying cause of your hypoxia.  Follow these instructions at home:  · Take over-the-counter and prescription medicines only as told by your health care provider.  · Do not use any products that contain nicotine or tobacco, such as cigarettes and e-cigarettes. If you need help quitting, ask your health care provider.  · Avoid secondhand smoke.  · Work with your health care provider to manage any chronic conditions you have that may be causing hypoxia, such as COPD.  · Keep all follow-up visits as told by your health care provider. This is important.  Contact a health care provider if:  · You have a fever.  · You have trouble breathing, even after treatment.  · You become extremely short of breath when you exercise.  Get help right away if:  · Your shortness of breath gets worse, especially with normal or very little activity.  · Your skin, lips, or nail beds have a bluish color.  · You become confused or you cannot think properly.  · You have chest pain.  Summary  · Hypoxia is a condition that happens when there is a lack of oxygen in the body's tissues and organs.  · If hypoxia is not treated, it can lead to convulsions, loss of consciousness (coma), or brain damage.  · Symptoms of hypoxia can include a headache, shortness of breath, confusion, nausea, and a bluish skin color.  · Hypoxia has many possible causes, including exposure  to high altitude, carbon monoxide poisoning, or other health issues, such as blood disorders or cardiac arrest.  · Hypoxia is usually treated with oxygen therapy.  This information is not intended to replace advice given to you by your health care provider. Make sure you discuss any questions you have with your health care provider.  Document Released: 02/05/2018 Document Revised: 11/30/2018 Document Reviewed: 02/05/2018  [employer]vier Patient Education © 2020 [employer]        Depression / Suicide Risk    As you are discharged from this [facility] facility, it is important to learn how to keep safe from harming yourself.    Recognize the warning signs:  · Abrupt changes in personality, positive or negative- including increase in energy   · Giving away possessions  · Change in eating patterns- significant weight changes-  positive or negative  · Change in sleeping patterns- unable to sleep or sleeping all the time   · Unwillingness or inability to communicate  · Depression  · Unusual sadness, discouragement and loneliness  · Talk of wanting to die  · Neglect of personal appearance   · Rebelliousness- reckless behavior  · Withdrawal from people/activities they love  · Confusion- inability to concentrate     If you or a loved one observes any of these behaviors or has concerns about self-harm, here's what you can do:  · Talk about it- your feelings and reasons for harming yourself  · Remove any means that you might use to hurt yourself (examples: pills, rope, extension cords, firearm)  · Get professional help from the community (Mental Health, Substance Abuse, psychological counseling)  · Do not be alone:Call your Safe Contact- someone whom you trust who will be there for you.  · Call your local CRISIS HOTLINE 794-6692 or 002-679-6866  · Call your local Children's Mobile Crisis Response Team [place] (175) 929-3177 or www.[employer]  · Call the toll free National Suicide Prevention Hotlines   · National  Suicide Prevention Lifeline 010-593-YLUL (9909)  · National [place] Line Network 800-SUICIDE (436-3766)

## 2022-03-21 ENCOUNTER — OFFICE VISIT (OUTPATIENT)
Dept: MEDICAL GROUP | Facility: MEDICAL CENTER | Age: 42
End: 2022-03-21
Payer: COMMERCIAL

## 2022-03-21 VITALS
DIASTOLIC BLOOD PRESSURE: 62 MMHG | RESPIRATION RATE: 18 BRPM | BODY MASS INDEX: 39.96 KG/M2 | TEMPERATURE: 98.3 F | SYSTOLIC BLOOD PRESSURE: 114 MMHG | HEIGHT: 72 IN | HEART RATE: 75 BPM | WEIGHT: 295 LBS | OXYGEN SATURATION: 96 %

## 2022-03-21 DIAGNOSIS — D75.839 THROMBOCYTOSIS: ICD-10-CM

## 2022-03-21 DIAGNOSIS — R74.01 TRANSAMINITIS: ICD-10-CM

## 2022-03-21 DIAGNOSIS — U07.1 COVID-19: ICD-10-CM

## 2022-03-21 PROBLEM — J96.01 ACUTE RESPIRATORY FAILURE WITH HYPOXIA (HCC): Status: RESOLVED | Noted: 2022-03-10 | Resolved: 2022-03-21

## 2022-03-21 PROBLEM — R50.9 FEVER: Status: RESOLVED | Noted: 2021-04-20 | Resolved: 2022-03-21

## 2022-03-21 PROCEDURE — 99214 OFFICE O/P EST MOD 30 MIN: CPT | Mod: CS | Performed by: FAMILY MEDICINE

## 2022-03-21 ASSESSMENT — FIBROSIS 4 INDEX: FIB4 SCORE: 0.5

## 2022-03-21 NOTE — PROGRESS NOTES
"[facility]  Progress Note  Established Patient    Subjective:   Moris Moffett is a 41 y.o. male here today with a chief complaint of COVID. The patient is accompanied by his wife.     Thrombocytosis  Mild.     Transaminitis  Mild, in setting of COVID.    COVID-19  Patient was recently hospitalized with COVID-19.  He is currently maintained on supplemental oxygen.  He is using 2 L/min during the day 4 L/min at night.  He has a slight cough but is otherwise recovering well.  He is not vaccinated against COVID-19.  Patient's BMI is 40.      Current Outpatient Medications on File Prior to Visit   Medication Sig Dispense Refill   • Cholecalciferol (VITAMIN D) 50 MCG (2000 UT) Cap Take 1 Capsule by mouth every day. 30 Capsule 3   • Ascorbic Acid (VITAMIN C) 500 MG Chew Tab Chew 1 Tablet every day for 10 days. 10 Tablet 0   • albuterol 108 (90 Base) MCG/ACT Aero Soln inhalation aerosol Inhale 1-2 Puffs every four hours as needed for Shortness of Breath. Use with spacer. 1 Each 0   • Cyanocobalamin (VITAMIN B-12) 1000 MCG Tab Take 1 Tablet by mouth every day. 100 Tablet 4     No current facility-administered medications on file prior to visit.          Objective:     Vitals:    03/21/22 1117   BP: 114/62   BP Location: Left arm   Patient Position: Sitting   BP Cuff Size: Large adult   Pulse: 75   Resp: 18   Temp: 36.8 °C (98.3 °F)   TempSrc: Temporal   SpO2: 96%   Weight: (!) 134 kg (295 lb)   Height: 1.816 m (5' 11.5\")       Physical Exam:  General: alert in no apparent distress.   Cardio: RRR.   Resp: CTAB.     SpO2: On room air at rest the lowest was 93%.  On room air with activity the lowest was 85%.  On 2 L/min with activity, the lowest was 86%.  On 3 L/min with activity, the lowest was 91%.        Assessment and Plan:     1. COVID-19  - The patient will complete his dexamethasone today.  - Recommended COVID vaccination, patient remains reluctant.  - Recommended 4 L/min of supplemental oxygen with " sleep, 3 L/min with activity.  Patient may use room air while at rest.  -Repeat labs in 6 weeks.  - CBC WITH DIFFERENTIAL; Future  - Comp Metabolic Panel; Future  - Referral to Pulmonary and Sleep Medicine as we may require assistance with oxygen titration and follow-up imaging.  Lung lymphadenopathy likely a product of Covid but I would wonder if follow-up chest x-ray or CT scan would be advisable.    2. Thrombocytosis  - CBC.     3. Transaminitis  - CMP.       Followup: Return in about 2 weeks (around 4/4/2022), or if symptoms worsen or fail to improve.

## 2022-03-21 NOTE — ASSESSMENT & PLAN NOTE
Patient was recently hospitalized with COVID-19.  He is currently maintained on supplemental oxygen.  He is using 2 L/min during the day 4 L/min at night.  He has a slight cough but is otherwise recovering well.  He is not vaccinated against COVID-19.  Patient's BMI is 40.  Perihilar lymphadenopathy identified on past imaging.

## 2022-04-06 ENCOUNTER — OFFICE VISIT (OUTPATIENT)
Dept: MEDICAL GROUP | Facility: MEDICAL CENTER | Age: 42
End: 2022-04-06
Payer: COMMERCIAL

## 2022-04-06 VITALS
RESPIRATION RATE: 20 BRPM | HEIGHT: 72 IN | SYSTOLIC BLOOD PRESSURE: 126 MMHG | DIASTOLIC BLOOD PRESSURE: 72 MMHG | TEMPERATURE: 99.1 F | HEART RATE: 84 BPM | OXYGEN SATURATION: 94 % | BODY MASS INDEX: 40.12 KG/M2 | WEIGHT: 296.2 LBS

## 2022-04-06 DIAGNOSIS — U07.1 COVID-19: ICD-10-CM

## 2022-04-06 PROCEDURE — 99213 OFFICE O/P EST LOW 20 MIN: CPT | Performed by: FAMILY MEDICINE

## 2022-04-06 ASSESSMENT — FIBROSIS 4 INDEX: FIB4 SCORE: 0.5

## 2022-04-07 NOTE — PROGRESS NOTES
"[facility]  Progress Note  Established Patient    Subjective:   Moris Moffett is a 41 y.o. male here today with a chief complaint of COVID. The patient is accompanied by his wife.     COVID-19  Patient is doing well.  He has been titrating his own oxygen at home.  He is currently not using any oxygen during the day and only using 1 L of supplemental oxygen while he sleeps.  During the day, even after activity, he checks his oxygen and it is consistently at 93 to 94%.  We walked him in our clinic for 6 minutes on room air and we got 94%.  At night, he is taking 1 L of oxygen continuously.  Sometimes he wakes in the middle of the night and when he does he checks his oxygen.  It is consistently 93 to 94%.  In the morning, it is also 93 to 94% on 1 L/min.  He has done some experimentation and taken the oxygen off while he sleeps.  At one point, he checked his oxygen after waking up while on room air and the SpO2 was 91%.  He is feeling well with no significant shortness of breath.      Current Outpatient Medications on File Prior to Visit   Medication Sig Dispense Refill   • Cholecalciferol (VITAMIN D) 50 MCG (2000 UT) Cap Take 1 Capsule by mouth every day. 30 Capsule 3   • albuterol 108 (90 Base) MCG/ACT Aero Soln inhalation aerosol Inhale 1-2 Puffs every four hours as needed for Shortness of Breath. Use with spacer. 1 Each 0   • Cyanocobalamin (VITAMIN B-12) 1000 MCG Tab Take 1 Tablet by mouth every day. 100 Tablet 4     No current facility-administered medications on file prior to visit.          Objective:     Vitals:    04/06/22 1631 04/06/22 1642   BP: 126/72    BP Location: Left arm    Patient Position: Sitting    BP Cuff Size: Adult long    Pulse: 71 84   Resp: 20    Temp: 37.3 °C (99.1 °F)    TempSrc: Temporal    SpO2: 94% 94%   Weight: (!) 134 kg (296 lb 3.2 oz)    Height: 1.816 m (5' 11.5\")        Physical Exam:  General: alert in no apparent distress.   Resp: CTAB.         Assessment and " Plan:     1. COVID-19  Patient is recovering well.  He will remain off oxygen during the day and I asked him to reduce his supplemental oxygen at night to 0.5 L/min.  I asked him to check his oxygen levels at night and when he first wakes up and send me these values in a few days.  I expect we will be able to stop nocturnal oxygen.  To determine need for follow-up imaging, as well as the appropriate modality, I suggested an E consult to pulmonology with the patient.  He will consider this.        Followup: Return if symptoms worsen or fail to improve.

## 2022-04-07 NOTE — ASSESSMENT & PLAN NOTE
Patient is doing well.  He has been titrating his own oxygen at home.  He is currently not using any oxygen during the day and only using 1 L of supplemental oxygen while he sleeps.  During the day, even after activity, he checks his oxygen and it is consistently at 93 to 94%.  We walked him in our clinic for 6 minutes on room air and we got 94%.  At night, he is taking 1 L of oxygen continuously.  Sometimes he wakes in the middle of the night and when he does he checks his oxygen.  It is consistently 93 to 94%.  In the morning, it is also 93 to 94% on 1 L/min.  He has done some experimentation and taken the oxygen off while he sleeps.  At one point, he checked his oxygen after waking up while on room air and the SpO2 was 91%.  He is feeling well with no significant shortness of breath.

## 2022-06-01 ENCOUNTER — OFFICE VISIT (OUTPATIENT)
Dept: MEDICAL GROUP | Facility: LAB | Age: 42
End: 2022-06-01
Payer: COMMERCIAL

## 2022-06-01 VITALS
DIASTOLIC BLOOD PRESSURE: 90 MMHG | HEART RATE: 80 BPM | OXYGEN SATURATION: 96 % | BODY MASS INDEX: 41.31 KG/M2 | WEIGHT: 305 LBS | TEMPERATURE: 96.8 F | SYSTOLIC BLOOD PRESSURE: 154 MMHG | HEIGHT: 72 IN

## 2022-06-01 DIAGNOSIS — N63.0 BREAST MASS IN MALE: ICD-10-CM

## 2022-06-01 DIAGNOSIS — Z13.6 SCREENING FOR CARDIOVASCULAR CONDITION: ICD-10-CM

## 2022-06-01 DIAGNOSIS — R59.0 HILAR LYMPHADENOPATHY: ICD-10-CM

## 2022-06-01 DIAGNOSIS — Z76.89 ENCOUNTER TO ESTABLISH CARE: ICD-10-CM

## 2022-06-01 DIAGNOSIS — R59.0 LOCALIZED ENLARGED LYMPH NODES: ICD-10-CM

## 2022-06-01 DIAGNOSIS — U07.1 COVID-19: ICD-10-CM

## 2022-06-01 DIAGNOSIS — E53.8 VITAMIN B12 DEFICIENCY: ICD-10-CM

## 2022-06-01 PROCEDURE — 99214 OFFICE O/P EST MOD 30 MIN: CPT | Performed by: FAMILY MEDICINE

## 2022-06-01 ASSESSMENT — ENCOUNTER SYMPTOMS
CHILLS: 0
FEVER: 0
SHORTNESS OF BREATH: 0
PALPITATIONS: 0
CONSTIPATION: 0
DEPRESSION: 0
VOMITING: 0
NERVOUS/ANXIOUS: 0
BLOOD IN STOOL: 0
NAUSEA: 0
DIARRHEA: 0
ABDOMINAL PAIN: 0
WHEEZING: 0

## 2022-06-01 ASSESSMENT — FIBROSIS 4 INDEX: FIB4 SCORE: 0.5

## 2022-06-01 NOTE — PROGRESS NOTES
Subjective:   Moris Moffett is a 41 y.o. male here today for   Chief Complaint   Patient presents with   • New Patient   • Establish Care     #Establish care   -Reviewed all past medical history, family history, social history.  -Reviewed all screening/vaccinations: Due to tdap, covid 19 vaccination as well as lipid profile.   -Diet and Exercise: Working on improving diet and exercise regimen.  -Tobacco, alcohol, recreational drug use: Very rare alcohol use.  Denies any tobacco, recreational drug use.  -Sexually active: Monogamous with female partner, no concerns    #COVID-19  -Previously diagnosed with COVID-19 in March. This let to hypoxia and pneumonia. Was on supplemental oxygen for several weeks. Patient states that he is feeling well. Denies any chest pain, SOB, difficulty breathing, fever, chills, cough.   -At time of diagnosis a CTA was completed, showing hilar lymphadenopathy.  At that time PCP was going to follow-up for further imaging; however, patient had to be discharged to find another PCP.  -Currently patient is doing well and here to discuss further evaluation for hilar lymphadenopathy.    #Vitamin B12 deficiency:  -Patient states that remotely he was experiencing some generalized numbness, tingling upper extremities.  At that time work-up did show low vitamin B12 and he started vitamin B12 supplement.  Since then he feels like the neuropathic symptoms of upper extremities have improved.  He denies any numbness, ting, weakness in upper or lower extremities.  Is requesting lab work for check a B12 level.    #Breast mass:  -Patient states for several months has noticed a small mass in the right outer quadrant of the right breast.  He states it is slightly painful.  Pain is intermittent, sharp, nonradiating.  Does have a significant family history of breast cancer and several first and second-degree relatives.  Denies any skin changes, nipple discharge, trauma, pertinent past medical  "history.        Allergies   Allergen Reactions   • Amoxicillin Hives     Hives when he was 9 years old         Current medicines (including changes today)  Current Outpatient Medications   Medication Sig Dispense Refill   • Cholecalciferol (VITAMIN D) 50 MCG (2000 UT) Cap Take 1 Capsule by mouth every day. 30 Capsule 3   • Cyanocobalamin (VITAMIN B-12) 1000 MCG Tab Take 1 Tablet by mouth every day. 100 Tablet 4   • albuterol 108 (90 Base) MCG/ACT Aero Soln inhalation aerosol Inhale 1-2 Puffs every four hours as needed for Shortness of Breath. Use with spacer. (Patient not taking: Reported on 6/1/2022) 1 Each 0     No current facility-administered medications for this visit.     He  has a past medical history of Obesity (BMI 35.0-39.9 without comorbidity).    ROS   Review of Systems   Constitutional: Negative for chills and fever.   Respiratory: Negative for shortness of breath and wheezing.    Cardiovascular: Negative for chest pain and palpitations.   Gastrointestinal: Negative for abdominal pain, blood in stool, constipation, diarrhea, melena, nausea and vomiting.   Psychiatric/Behavioral: Negative for depression. The patient is not nervous/anxious.           Objective:     Physical Exam:  BP (!) 154/90 (BP Location: Right arm, Patient Position: Sitting, BP Cuff Size: Adult long)   Pulse 80   Temp 36 °C (96.8 °F) (Temporal)   Ht 1.816 m (5' 11.5\")   Wt (!) 138 kg (305 lb)   SpO2 96%  Body mass index is 41.95 kg/m².   Constitutional: Alert, no distress.  Skin: Warm, dry, good turgor, no rashes in visible areas.  Eye: Equal, round and reactive, conjunctiva clear, lids normal.  Respiratory: Unlabored respiratory effort, lungs clear to auscultation, no wheezes, no rhonchi.  Cardiovascular: Normal S1, S2, no murmur, no edema.  Abdomen: Soft, non-tender, no masses, no hepatosplenomegaly.  Psych: Alert and oriented x3, normal affect and mood.    Assessment and Plan:     1. Encounter to establish care  -All " questions concerns were answered at this time.  -Vaccinations/screenings needed at this time: Due for labs including lipid profile.  -Labs reviewed, no concerns, recheck labs as below.  -Discussed the importance of a healthy, Mediterranean style diet, routine exercise regimen.  -Discussed general safety measures including seatbelts, helmets, avoidance of smoking, sunscreen, hydration.  -Follow-up for general physical exam on a yearly basis, sooner if needed.    2. COVID-19  -Stable.  Patient is relatively asymptomatic, breathing well with normal vital signs.  We will recheck labs as below to assure normalization.  Discussed course of illness for COVID-19.  Discussed return precautions.  - CBC WITHOUT DIFFERENTIAL; Future  - Comp Metabolic Panel; Future    3. Localized enlarged lymph nodes  -Given localized enlarged lymph nodes as well as hilar lymphadenopathy seen on CTA at diagnosis in March, will repeat CT for further evaluation.  No B symptoms at this time, will check CBC, CMP as above as well.  We will follow-up with patient with results.  - CT-CHEST (THORAX) W/O; Future    4. Hilar lymphadenopathy  -See #3  - CT-CHEST (THORAX) W/O; Future    5. Vitamin B12 deficiency  -Stable.  Continue with vitamin B12 supplement.  We will recheck levels at this time.  - VITAMIN B12; Future    6. Breast mass in male  -Uncertain etiology of breast mass.  On examination there was, what appeared, there is some fibroglandular changes with no significant, can prescription mass in breast.;  However, given symptoms further evaluation is needed and will complete ultrasound at this time.  - US-BREAST LIMITED-RIGHT; Future    7. Screening for cardiovascular condition  - Lipid Profile; Future      Followup: No follow-ups on file.         PLEASE NOTE: This dictation was created using voice recognition software. I have made every reasonable attempt to correct obvious errors, but I expect that there are errors of grammar and possibly  content that I did not discover before finalizing the note.

## 2022-06-24 LAB
ALBUMIN SERPL-MCNC: 4.1 G/DL (ref 4–5)
ALBUMIN/GLOB SERPL: 1.5 {RATIO} (ref 1.2–2.2)
ALP SERPL-CCNC: 74 IU/L (ref 44–121)
ALT SERPL-CCNC: 32 IU/L (ref 0–44)
AST SERPL-CCNC: 22 IU/L (ref 0–40)
BILIRUB SERPL-MCNC: 0.3 MG/DL (ref 0–1.2)
BUN SERPL-MCNC: 16 MG/DL (ref 6–24)
BUN/CREAT SERPL: 17 (ref 9–20)
CALCIUM SERPL-MCNC: 8.9 MG/DL (ref 8.7–10.2)
CHLORIDE SERPL-SCNC: 103 MMOL/L (ref 96–106)
CHOLEST SERPL-MCNC: 186 MG/DL (ref 100–199)
CO2 SERPL-SCNC: 22 MMOL/L (ref 20–29)
CREAT SERPL-MCNC: 0.95 MG/DL (ref 0.76–1.27)
EGFRCR SERPLBLD CKD-EPI 2021: 102 ML/MIN/1.73
ERYTHROCYTE [DISTWIDTH] IN BLOOD BY AUTOMATED COUNT: 12 % (ref 11.6–15.4)
GLOBULIN SER CALC-MCNC: 2.7 G/DL (ref 1.5–4.5)
GLUCOSE SERPL-MCNC: 89 MG/DL (ref 65–99)
HCT VFR BLD AUTO: 48.2 % (ref 37.5–51)
HDLC SERPL-MCNC: 50 MG/DL
HGB BLD-MCNC: 16.5 G/DL (ref 13–17.7)
LABORATORY COMMENT REPORT: ABNORMAL
LDLC SERPL CALC-MCNC: 105 MG/DL (ref 0–99)
MCH RBC QN AUTO: 31.3 PG (ref 26.6–33)
MCHC RBC AUTO-ENTMCNC: 34.2 G/DL (ref 31.5–35.7)
MCV RBC AUTO: 92 FL (ref 79–97)
NRBC BLD AUTO-RTO: NORMAL %
POTASSIUM SERPL-SCNC: 4.2 MMOL/L (ref 3.5–5.2)
PROT SERPL-MCNC: 6.8 G/DL (ref 6–8.5)
RBC # BLD AUTO: 5.27 X10E6/UL (ref 4.14–5.8)
SODIUM SERPL-SCNC: 139 MMOL/L (ref 134–144)
TRIGL SERPL-MCNC: 181 MG/DL (ref 0–149)
VIT B12 SERPL-MCNC: 793 PG/ML (ref 232–1245)
VLDLC SERPL CALC-MCNC: 31 MG/DL (ref 5–40)
WBC # BLD AUTO: 8.4 X10E3/UL (ref 3.4–10.8)

## 2023-07-12 SDOH — ECONOMIC STABILITY: TRANSPORTATION INSECURITY
IN THE PAST 12 MONTHS, HAS THE LACK OF TRANSPORTATION KEPT YOU FROM MEDICAL APPOINTMENTS OR FROM GETTING MEDICATIONS?: PATIENT DECLINED

## 2023-07-12 SDOH — ECONOMIC STABILITY: HOUSING INSECURITY
IN THE LAST 12 MONTHS, WAS THERE A TIME WHEN YOU DID NOT HAVE A STEADY PLACE TO SLEEP OR SLEPT IN A SHELTER (INCLUDING NOW)?: PATIENT REFUSED

## 2023-07-12 SDOH — HEALTH STABILITY: PHYSICAL HEALTH
ON AVERAGE, HOW MANY DAYS PER WEEK DO YOU ENGAGE IN MODERATE TO STRENUOUS EXERCISE (LIKE A BRISK WALK)?: PATIENT DECLINED

## 2023-07-12 SDOH — ECONOMIC STABILITY: FOOD INSECURITY: WITHIN THE PAST 12 MONTHS, YOU WORRIED THAT YOUR FOOD WOULD RUN OUT BEFORE YOU GOT MONEY TO BUY MORE.: PATIENT DECLINED

## 2023-07-12 SDOH — HEALTH STABILITY: PHYSICAL HEALTH: ON AVERAGE, HOW MANY MINUTES DO YOU ENGAGE IN EXERCISE AT THIS LEVEL?: PATIENT DECLINED

## 2023-07-12 SDOH — ECONOMIC STABILITY: INCOME INSECURITY: HOW HARD IS IT FOR YOU TO PAY FOR THE VERY BASICS LIKE FOOD, HOUSING, MEDICAL CARE, AND HEATING?: PATIENT DECLINED

## 2023-07-12 SDOH — ECONOMIC STABILITY: FOOD INSECURITY: WITHIN THE PAST 12 MONTHS, THE FOOD YOU BOUGHT JUST DIDN'T LAST AND YOU DIDN'T HAVE MONEY TO GET MORE.: PATIENT DECLINED

## 2023-07-12 SDOH — ECONOMIC STABILITY: HOUSING INSECURITY

## 2023-07-12 SDOH — ECONOMIC STABILITY: TRANSPORTATION INSECURITY
IN THE PAST 12 MONTHS, HAS LACK OF RELIABLE TRANSPORTATION KEPT YOU FROM MEDICAL APPOINTMENTS, MEETINGS, WORK OR FROM GETTING THINGS NEEDED FOR DAILY LIVING?: PATIENT DECLINED

## 2023-07-12 SDOH — ECONOMIC STABILITY: INCOME INSECURITY: IN THE LAST 12 MONTHS, WAS THERE A TIME WHEN YOU WERE NOT ABLE TO PAY THE MORTGAGE OR RENT ON TIME?: PATIENT REFUSED

## 2023-07-12 SDOH — HEALTH STABILITY: MENTAL HEALTH
STRESS IS WHEN SOMEONE FEELS TENSE, NERVOUS, ANXIOUS, OR CAN'T SLEEP AT NIGHT BECAUSE THEIR MIND IS TROUBLED. HOW STRESSED ARE YOU?: PATIENT DECLINED

## 2023-07-12 SDOH — ECONOMIC STABILITY: TRANSPORTATION INSECURITY
IN THE PAST 12 MONTHS, HAS LACK OF TRANSPORTATION KEPT YOU FROM MEETINGS, WORK, OR FROM GETTING THINGS NEEDED FOR DAILY LIVING?: PATIENT DECLINED

## 2023-07-12 ASSESSMENT — SOCIAL DETERMINANTS OF HEALTH (SDOH)
IN A TYPICAL WEEK, HOW MANY TIMES DO YOU TALK ON THE PHONE WITH FAMILY, FRIENDS, OR NEIGHBORS?: PATIENT DECLINED
IN A TYPICAL WEEK, HOW MANY TIMES DO YOU TALK ON THE PHONE WITH FAMILY, FRIENDS, OR NEIGHBORS?: PATIENT DECLINED
HOW OFTEN DO YOU HAVE SIX OR MORE DRINKS ON ONE OCCASION: NEVER
HOW HARD IS IT FOR YOU TO PAY FOR THE VERY BASICS LIKE FOOD, HOUSING, MEDICAL CARE, AND HEATING?: PATIENT DECLINED
DO YOU BELONG TO ANY CLUBS OR ORGANIZATIONS SUCH AS CHURCH GROUPS UNIONS, FRATERNAL OR ATHLETIC GROUPS, OR SCHOOL GROUPS?: PATIENT DECLINED
HOW OFTEN DO YOU ATTEND CHURCH OR RELIGIOUS SERVICES?: PATIENT DECLINED
HOW OFTEN DO YOU ATTENT MEETINGS OF THE CLUB OR ORGANIZATION YOU BELONG TO?: PATIENT DECLINED
DO YOU BELONG TO ANY CLUBS OR ORGANIZATIONS SUCH AS CHURCH GROUPS UNIONS, FRATERNAL OR ATHLETIC GROUPS, OR SCHOOL GROUPS?: PATIENT DECLINED
HOW OFTEN DO YOU ATTEND CHURCH OR RELIGIOUS SERVICES?: PATIENT DECLINED
HOW OFTEN DO YOU ATTENT MEETINGS OF THE CLUB OR ORGANIZATION YOU BELONG TO?: PATIENT DECLINED
HOW OFTEN DO YOU GET TOGETHER WITH FRIENDS OR RELATIVES?: PATIENT DECLINED
HOW MANY DRINKS CONTAINING ALCOHOL DO YOU HAVE ON A TYPICAL DAY WHEN YOU ARE DRINKING: 1 OR 2
WITHIN THE PAST 12 MONTHS, YOU WORRIED THAT YOUR FOOD WOULD RUN OUT BEFORE YOU GOT THE MONEY TO BUY MORE: PATIENT DECLINED
HOW OFTEN DO YOU HAVE A DRINK CONTAINING ALCOHOL: MONTHLY OR LESS
HOW OFTEN DO YOU GET TOGETHER WITH FRIENDS OR RELATIVES?: PATIENT DECLINED

## 2023-07-12 ASSESSMENT — LIFESTYLE VARIABLES
HOW MANY STANDARD DRINKS CONTAINING ALCOHOL DO YOU HAVE ON A TYPICAL DAY: 1 OR 2
AUDIT-C TOTAL SCORE: 1
HOW OFTEN DO YOU HAVE SIX OR MORE DRINKS ON ONE OCCASION: NEVER
HOW OFTEN DO YOU HAVE A DRINK CONTAINING ALCOHOL: MONTHLY OR LESS
SKIP TO QUESTIONS 9-10: 1

## 2023-07-13 ENCOUNTER — OFFICE VISIT (OUTPATIENT)
Dept: MEDICAL GROUP | Facility: LAB | Age: 43
End: 2023-07-13
Payer: COMMERCIAL

## 2023-07-13 VITALS
WEIGHT: 315 LBS | HEIGHT: 72 IN | RESPIRATION RATE: 16 BRPM | SYSTOLIC BLOOD PRESSURE: 130 MMHG | HEART RATE: 94 BPM | OXYGEN SATURATION: 95 % | TEMPERATURE: 97.7 F | BODY MASS INDEX: 42.66 KG/M2 | DIASTOLIC BLOOD PRESSURE: 74 MMHG

## 2023-07-13 DIAGNOSIS — R07.9 CHEST PAIN, UNSPECIFIED TYPE: ICD-10-CM

## 2023-07-13 DIAGNOSIS — Z13.6 SCREENING FOR CARDIOVASCULAR CONDITION: ICD-10-CM

## 2023-07-13 DIAGNOSIS — Z01.89 PATIENT REQUESTED DIAGNOSTIC TESTING: ICD-10-CM

## 2023-07-13 DIAGNOSIS — Z00.00 ANNUAL PHYSICAL EXAM: ICD-10-CM

## 2023-07-13 PROCEDURE — 3078F DIAST BP <80 MM HG: CPT | Performed by: FAMILY MEDICINE

## 2023-07-13 PROCEDURE — 3075F SYST BP GE 130 - 139MM HG: CPT | Performed by: FAMILY MEDICINE

## 2023-07-13 PROCEDURE — 99396 PREV VISIT EST AGE 40-64: CPT | Performed by: FAMILY MEDICINE

## 2023-07-13 ASSESSMENT — ENCOUNTER SYMPTOMS
ABDOMINAL PAIN: 0
CHILLS: 0
CONSTIPATION: 0
DIARRHEA: 0
NERVOUS/ANXIOUS: 0
WHEEZING: 0
ORTHOPNEA: 0
CLAUDICATION: 0
SHORTNESS OF BREATH: 0
DEPRESSION: 0
NAUSEA: 0
VOMITING: 0
PALPITATIONS: 0
FEVER: 0
PND: 0

## 2023-07-13 ASSESSMENT — PATIENT HEALTH QUESTIONNAIRE - PHQ9: CLINICAL INTERPRETATION OF PHQ2 SCORE: 0

## 2023-07-13 ASSESSMENT — FIBROSIS 4 INDEX: FIB4 SCORE: 0.34

## 2023-07-13 NOTE — PROGRESS NOTES
"Subjective:   Moris Moffett is a 42 y.o. male here today for   Chief Complaint   Patient presents with    Annual Exam    Chest Pressure     Unsure if anxiety related, Heavy chest, seems to resolved.      #Annual   -Reviewed all past medical history, family history, social history.  -Reviewed all screening/vaccinations: Due for Tdap.  Due for labs including lipid profile.  -Diet and Exercise: No real diet or exercise regimen.  Working on improving over this summer.  -Tobacco, alcohol, recreational drug use: Rare alcohol use.  Denies any tobacco, drug use.    #Chest pressure:  -She states over the last several months he has been having symptoms of, what he described as, \"tingling\" sensation in chest.  He states that it has been occurring in several different areas across chest both left and right sides.  They are mild, intermittent episodes, not related to any exercise.  He does state that there is some relation to these symptoms as well as his anxiety level.  He is concerned about his heart health.  He does admit that there are times he becomes very hyper focused on his health and perseverates over certain aspects.  Patient denies any shortness of breath, chest pain on the left side, difficulty sleeping, paroxysmal nocturnal dyspnea, increased fatigue, exercise intolerance, lower extremity swelling.  -Patient does state he has a significant history of cardiac valve replacement in first and second-degree relatives.  Patient has never been told nor had imaging completed for any heart valve issues.        Allergies   Allergen Reactions    Amoxicillin Hives     Hives when he was 9 years old         Current medicines (including changes today)  Current Outpatient Medications   Medication Sig Dispense Refill    Cholecalciferol (VITAMIN D) 50 MCG (2000 UT) Cap Take 1 Capsule by mouth every day. 30 Capsule 3    Cyanocobalamin (VITAMIN B-12) 1000 MCG Tab Take 1 Tablet by mouth every day. 100 Tablet 4    albuterol 108 " (90 Base) MCG/ACT Aero Soln inhalation aerosol Inhale 1-2 Puffs every four hours as needed for Shortness of Breath. Use with spacer. 1 Each 0     No current facility-administered medications for this visit.     He  has a past medical history of Obesity (BMI 35.0-39.9 without comorbidity).    ROS   Review of Systems   Constitutional:  Negative for chills and fever.   Respiratory:  Negative for shortness of breath and wheezing.    Cardiovascular:  Negative for palpitations, orthopnea, claudication, leg swelling and PND.   Gastrointestinal:  Negative for abdominal pain, constipation, diarrhea, nausea and vomiting.   Psychiatric/Behavioral:  Negative for depression. The patient is not nervous/anxious.       Objective:     Physical Exam:  /74   Pulse 94   Temp 36.5 °C (97.7 °F) (Temporal)   Resp 16   Ht 1.829 m (6')   Wt (!) 144 kg (318 lb)   SpO2 95%  Body mass index is 43.13 kg/m².   Constitutional: Alert, no distress.  Skin: Warm, dry, good turgor, no rashes in visible areas.  Eye: Equal, round and reactive, conjunctiva clear, lids normal.  ENMT: TM's clear bilaterally, lips without lesions, good dentition, oropharynx clear.  Neck: Trachea midline, no masses, no thyromegaly. No cervical or supraclavicular lymphadenopathy.  Respiratory: Unlabored respiratory effort, lungs clear to auscultation, no wheezes, no rhonchi.  Cardiovascular: Normal S1, S2, no murmur, no edema.  Abdomen: Soft, non-tender, no masses, no hepatosplenomegaly.  Psych: Alert and oriented x3, normal affect and mood.    STOPBANG - Sleep Apnea Screening      Flowsheet Row Most Recent Value   S - Have you been told that you SNORE? Yes   T - Are you often TIRED during the day? No   O - Do you know if you stop breathing or has anyone witnessed you stop breathing while you were asleep? (OBSTRUCTION) No   P - Do you have high blood PRESSURE or on medication to control high blood pressure? No   B - Is your Body Mass Index greater than 35? (BMI)  Yes   A - Are you 50 years old or older? (AGE) No   N - Are you a male with a NECK circumference greater than 17 inches, or a female with a neck circumference greater than 16 inches? Yes   G - Are you male? (GENDER) Yes   STOPBANG Total Score 4   JARON Risk Intermediate Risk            Assessment and Plan:     1. Annual physical exam  -All questions concerns were answered at this time.  -Vaccinations/screenings needed at this time: We will check labs as below.  -Labs reviewed, no concerns  -Discussed the importance of a healthy, Mediterranean style diet, routine exercise regimen.  -Discussed general safety measures including seatbelts, helmets, avoidance of smoking, sunscreen, hydration.  -Follow-up for general physical exam on a yearly basis, sooner if needed.  - CBC WITHOUT DIFFERENTIAL; Future  - Comp Metabolic Panel; Future    2. Chest pain, unspecified type  -Symptoms patient spearing seeing do not appear to be cardiac in nature.  We did discuss that this is potentially due to anxiety over health.  We also discussed several factors we can work on optimizing/improving for better cardiac health.  We will check labs including blood glucose.  Blood pressure is normal.  No concerning findings on auscultation of heart.  He is at intermediate risk for sleep apnea.  He will consider potential testing in the future.  We discussed continue with appropriate diet and exercise regiment.  No significant red flag symptoms at this time although we did discuss strict return precautions.  Patient will follow-up as needed.    3. Patient requested diagnostic testing  - VITAMIN B12; Future    4. Screening for cardiovascular condition  - Lipid Profile; Future      Followup: No follow-ups on file.         PLEASE NOTE: This dictation was created using voice recognition software. I have made every reasonable attempt to correct obvious errors, but I expect that there are errors of grammar and possibly content that I did not discover  before finalizing the note.

## 2024-06-14 ENCOUNTER — OFFICE VISIT (OUTPATIENT)
Dept: MEDICAL GROUP | Facility: LAB | Age: 44
End: 2024-06-14
Payer: COMMERCIAL

## 2024-06-14 VITALS
SYSTOLIC BLOOD PRESSURE: 112 MMHG | DIASTOLIC BLOOD PRESSURE: 74 MMHG | WEIGHT: 315 LBS | TEMPERATURE: 97.3 F | HEIGHT: 72 IN | BODY MASS INDEX: 42.66 KG/M2 | HEART RATE: 64 BPM | RESPIRATION RATE: 18 BRPM | OXYGEN SATURATION: 95 %

## 2024-06-14 DIAGNOSIS — E53.8 VITAMIN B12 DEFICIENCY: ICD-10-CM

## 2024-06-14 DIAGNOSIS — Z13.6 SCREENING FOR CARDIOVASCULAR CONDITION: ICD-10-CM

## 2024-06-14 DIAGNOSIS — Z00.00 ANNUAL PHYSICAL EXAM: ICD-10-CM

## 2024-06-14 PROCEDURE — 3074F SYST BP LT 130 MM HG: CPT | Performed by: FAMILY MEDICINE

## 2024-06-14 PROCEDURE — 99396 PREV VISIT EST AGE 40-64: CPT | Performed by: FAMILY MEDICINE

## 2024-06-14 PROCEDURE — 3078F DIAST BP <80 MM HG: CPT | Performed by: FAMILY MEDICINE

## 2024-06-14 ASSESSMENT — PATIENT HEALTH QUESTIONNAIRE - PHQ9: CLINICAL INTERPRETATION OF PHQ2 SCORE: 0

## 2024-06-14 NOTE — PROGRESS NOTES
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 Plan: Discussed diagnosis in detail with patient. Posterior vitreous detachment accounts for the patient's complaints. There is no evidence of associated retinal pathology. All signs and risks of retinal detachment and tears were discussed. Patient instructed to call the office immediately if symptoms worsen. Will continue to observe. Verbal consent was acquired by the patient to use [employer] ambient listening note generation during this visit Yes    Subjective:   Moris Moffett is a 43 y.o. male here today for   Chief Complaint   Patient presents with    Annual Exam     History of Present Illness  The patient is a 43-year-old male who is here for annual physical exam. 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    The patient reports no new medical issues, hospitalizations, or procedures since his last consultation approximately a year ago. He is currently not on any medication and has no known allergies. He has no concerns regarding medical restrictions. He maintains a healthy diet and regular exercise regimen. He maintains regular dental check-ups and denies any auditory or visual concerns. His sleep patterns are normal. He denies any new sexual partners or concerns for sexually transmitted diseases. He denies experiencing chest pain, shortness of breath, abdominal pain, chronic nausea, vomiting, urinary issues, chronic headaches, depression, or anxiety. He has not undergone any blood work for the past 2 years. He has a history of mild vitamin B12 deficiency. He denies any orthopedic injuries in the past 6 months, musculoskeletal problems, controlled psychiatric disorders, seizures, diabetes, uncontrolled heart disease, lung disease, or hypertension. He denies any back pain or abdominal pain. He experienced a fever last week. He denies any joint pain in his shoulders, hips, or knees. He denies any concerns for a hernia, pain in the groin, or bulging. He occasionally experiences dull, pulsing pain in the pelvic crest, which has been present for over 10 years. He consulted a gastroenterologist when the pain first started, but no abnormalities were detected. The pain is not constant, and he can go a few weeks without noticing it. His bowel movements are regular. He underwent a colonoscopy in 2018.   He denies smoking, alcohol, or  "recreational or illicit drug use.   He is allergic to AMOXICILLIN.      Allergies   Allergen Reactions    Amoxicillin Hives     Hives when he was 9 years old         Current medicines (including changes today)  Current Outpatient Medications   Medication Sig Dispense Refill    Cholecalciferol (VITAMIN D) 50 MCG (2000 UT) Cap Take 1 Capsule by mouth every day. 30 Capsule 3    Cyanocobalamin (VITAMIN B-12) 1000 MCG Tab Take 1 Tablet by mouth every day. 100 Tablet 4    albuterol 108 (90 Base) MCG/ACT Aero Soln inhalation aerosol Inhale 1-2 Puffs every four hours as needed for Shortness of Breath. Use with spacer. 1 Each 0     No current facility-administered medications for this visit.     He  has a past medical history of Obesity (BMI 35.0-39.9 without comorbidity).    ROS   ROS  -See HPI     Objective:     Physical Exam:  /74   Pulse 64   Temp 36.3 °C (97.3 °F) (Temporal)   Resp 18   Ht 1.829 m (6' 0.01\")   Wt (!) 147 kg (325 lb)   SpO2 95%  Body mass index is 44.07 kg/m².   Constitutional: Alert, no distress.  Skin: Warm, dry, good turgor, no rashes in visible areas.  Eye: Equal, round and reactive, conjunctiva clear, lids normal.  ENMT: TM's clear bilaterally, lips without lesions, good dentition, oropharynx clear.  Neck: Trachea midline, no masses, no thyromegaly. No cervical or supraclavicular lymphadenopathy.  Respiratory: Unlabored respiratory effort, lungs clear to auscultation, no wheezes, no rhonchi.  Cardiovascular: Normal S1, S2, no murmur, no edema.  Abdomen: Soft, non-tender, no masses, no hepatosplenomegaly.  Psych: Alert and oriented x3, normal affect and mood.    Results      Assessment and Plan:     Assessment & Plan  1. Annual physical exam.  -All questions concerns were answered at this time.  -Vaccinations/screenings needed at this time: Patient declines all vaccines at this time.  Blood work ordered including lipid profile.  -Labs reviewed, no concerns, recheck labs as " below  -Discussed the importance of a healthy, Mediterranean style diet, routine exercise regimen.  -Discussed general safety measures including seatbelts, helmets, avoidance of smoking, sunscreen, hydration.  -Follow-up for general physical exam on a yearly basis, sooner if needed.      Orders:  1. Annual physical exam  - CBC WITHOUT DIFFERENTIAL; Future  - Comp Metabolic Panel; Future    2. Vitamin B12 deficiency  - VITAMIN B12; Future    3. Screening for cardiovascular condition  - Lipid Profile; Future        Followup: No follow-ups on file.         PLEASE NOTE: This dictation was created using voice recognition and [employer] ambient listening software. I have made every reasonable attempt to correct obvious errors, but I expect that there are errors of grammar and possibly content that I did not discover before finalizing the note.

## 2024-07-03 ENCOUNTER — OFFICE VISIT (OUTPATIENT)
Dept: MEDICAL GROUP | Facility: LAB | Age: 44
End: 2024-07-03
Payer: COMMERCIAL

## 2024-07-03 VITALS
WEIGHT: 315 LBS | SYSTOLIC BLOOD PRESSURE: 124 MMHG | DIASTOLIC BLOOD PRESSURE: 70 MMHG | TEMPERATURE: 96.8 F | HEART RATE: 70 BPM | OXYGEN SATURATION: 96 % | BODY MASS INDEX: 42.66 KG/M2 | RESPIRATION RATE: 20 BRPM | HEIGHT: 72 IN

## 2024-07-03 DIAGNOSIS — L73.9 FOLLICULITIS: ICD-10-CM

## 2024-07-03 DIAGNOSIS — R05.1 ACUTE COUGH: ICD-10-CM

## 2024-07-03 PROBLEM — U07.1 COVID-19: Status: RESOLVED | Noted: 2022-03-21 | Resolved: 2024-07-03

## 2024-07-03 PROCEDURE — 99213 OFFICE O/P EST LOW 20 MIN: CPT | Performed by: STUDENT IN AN ORGANIZED HEALTH CARE EDUCATION/TRAINING PROGRAM

## 2024-07-03 PROCEDURE — 3074F SYST BP LT 130 MM HG: CPT | Performed by: STUDENT IN AN ORGANIZED HEALTH CARE EDUCATION/TRAINING PROGRAM

## 2024-07-03 PROCEDURE — 3078F DIAST BP <80 MM HG: CPT | Performed by: STUDENT IN AN ORGANIZED HEALTH CARE EDUCATION/TRAINING PROGRAM

## 2024-07-03 ASSESSMENT — ENCOUNTER SYMPTOMS
COUGH: 1
SHORTNESS OF BREATH: 0
CHILLS: 0
VOMITING: 0
ABDOMINAL PAIN: 0
FEVER: 0
DIARRHEA: 0
NAUSEA: 0
WEIGHT LOSS: 0

## 2025-08-04 ENCOUNTER — HOSPITAL ENCOUNTER (OUTPATIENT)
Dept: LAB | Facility: MEDICAL CENTER | Age: 45
End: 2025-08-04
Attending: FAMILY MEDICINE
Payer: COMMERCIAL

## 2025-08-04 ENCOUNTER — APPOINTMENT (OUTPATIENT)
Dept: MEDICAL GROUP | Facility: LAB | Age: 45
End: 2025-08-04
Payer: COMMERCIAL

## 2025-08-04 DIAGNOSIS — E53.8 VITAMIN B12 DEFICIENCY: ICD-10-CM

## 2025-08-04 DIAGNOSIS — Z13.6 SCREENING FOR CARDIOVASCULAR CONDITION: ICD-10-CM

## 2025-08-04 DIAGNOSIS — N52.9 ERECTILE DYSFUNCTION, UNSPECIFIED ERECTILE DYSFUNCTION TYPE: ICD-10-CM

## 2025-08-04 DIAGNOSIS — Z00.00 ANNUAL PHYSICAL EXAM: ICD-10-CM

## 2025-08-04 DIAGNOSIS — R53.83 OTHER FATIGUE: ICD-10-CM

## 2025-08-04 LAB
ALBUMIN SERPL BCP-MCNC: 4.2 G/DL (ref 3.2–4.9)
ALBUMIN/GLOB SERPL: 1.6 G/DL
ALP SERPL-CCNC: 72 U/L (ref 30–99)
ALT SERPL-CCNC: 74 U/L (ref 2–50)
ANION GAP SERPL CALC-SCNC: 13 MMOL/L (ref 7–16)
AST SERPL-CCNC: 44 U/L (ref 12–45)
BILIRUB SERPL-MCNC: 0.7 MG/DL (ref 0.1–1.5)
BUN SERPL-MCNC: 16 MG/DL (ref 8–22)
CALCIUM ALBUM COR SERPL-MCNC: 9 MG/DL (ref 8.5–10.5)
CALCIUM SERPL-MCNC: 9.2 MG/DL (ref 8.5–10.5)
CHLORIDE SERPL-SCNC: 107 MMOL/L (ref 96–112)
CHOLEST SERPL-MCNC: 146 MG/DL (ref 100–199)
CO2 SERPL-SCNC: 21 MMOL/L (ref 20–33)
CREAT SERPL-MCNC: 0.98 MG/DL (ref 0.5–1.4)
ERYTHROCYTE [DISTWIDTH] IN BLOOD BY AUTOMATED COUNT: 44.3 FL (ref 35.9–50)
GFR SERPLBLD CREATININE-BSD FMLA CKD-EPI: 96 ML/MIN/1.73 M 2
GLOBULIN SER CALC-MCNC: 2.7 G/DL (ref 1.9–3.5)
GLUCOSE SERPL-MCNC: 110 MG/DL (ref 65–99)
HCT VFR BLD AUTO: 49 % (ref 42–52)
HDLC SERPL-MCNC: 41 MG/DL
HGB BLD-MCNC: 16.6 G/DL (ref 14–18)
LDLC SERPL CALC-MCNC: 85 MG/DL
MCH RBC QN AUTO: 30.9 PG (ref 27–33)
MCHC RBC AUTO-ENTMCNC: 33.9 G/DL (ref 32.3–36.5)
MCV RBC AUTO: 91.2 FL (ref 81.4–97.8)
PLATELET # BLD AUTO: 230 K/UL (ref 164–446)
PMV BLD AUTO: 11.1 FL (ref 9–12.9)
POTASSIUM SERPL-SCNC: 4 MMOL/L (ref 3.6–5.5)
PROT SERPL-MCNC: 6.9 G/DL (ref 6–8.2)
RBC # BLD AUTO: 5.37 M/UL (ref 4.7–6.1)
SODIUM SERPL-SCNC: 141 MMOL/L (ref 135–145)
TRIGL SERPL-MCNC: 102 MG/DL (ref 0–149)
TSH SERPL DL<=0.005 MIU/L-ACNC: 3.17 UIU/ML (ref 0.38–5.33)
VIT B12 SERPL-MCNC: 338 PG/ML (ref 211–911)
WBC # BLD AUTO: 6.4 K/UL (ref 4.8–10.8)

## 2025-08-04 PROCEDURE — 83695 ASSAY OF LIPOPROTEIN(A): CPT

## 2025-08-04 PROCEDURE — 85027 COMPLETE CBC AUTOMATED: CPT

## 2025-08-04 PROCEDURE — 84402 ASSAY OF FREE TESTOSTERONE: CPT

## 2025-08-04 PROCEDURE — 84443 ASSAY THYROID STIM HORMONE: CPT

## 2025-08-04 PROCEDURE — 36415 COLL VENOUS BLD VENIPUNCTURE: CPT

## 2025-08-04 PROCEDURE — 80053 COMPREHEN METABOLIC PANEL: CPT

## 2025-08-04 PROCEDURE — 84270 ASSAY OF SEX HORMONE GLOBUL: CPT

## 2025-08-04 PROCEDURE — 82607 VITAMIN B-12: CPT

## 2025-08-04 PROCEDURE — 80061 LIPID PANEL: CPT

## 2025-08-04 PROCEDURE — 84403 ASSAY OF TOTAL TESTOSTERONE: CPT

## 2025-08-04 ASSESSMENT — ENCOUNTER SYMPTOMS
INSOMNIA: 1
WHEEZING: 0
NERVOUS/ANXIOUS: 0
BLURRED VISION: 0
DIARRHEA: 0
SHORTNESS OF BREATH: 0
PALPITATIONS: 0
CONSTIPATION: 0
ABDOMINAL PAIN: 0
DEPRESSION: 0

## 2025-08-04 ASSESSMENT — PATIENT HEALTH QUESTIONNAIRE - PHQ9: CLINICAL INTERPRETATION OF PHQ2 SCORE: 0

## 2025-08-06 LAB
LPA SERPL-MCNC: <6 MG/DL
SHBG SERPL-SCNC: 26 NMOL/L (ref 17–56)
TESTOST FREE MFR SERPL: 2 % (ref 1.6–2.9)
TESTOST FREE SERPL-MCNC: 59 PG/ML (ref 47–244)
TESTOST SERPL-MCNC: 292 NG/DL (ref 300–890)

## 2025-08-22 ENCOUNTER — OFFICE VISIT (OUTPATIENT)
Dept: MEDICAL GROUP | Facility: LAB | Age: 45
End: 2025-08-22
Payer: COMMERCIAL

## 2025-08-22 VITALS
DIASTOLIC BLOOD PRESSURE: 82 MMHG | OXYGEN SATURATION: 96 % | SYSTOLIC BLOOD PRESSURE: 126 MMHG | BODY MASS INDEX: 42.66 KG/M2 | HEART RATE: 60 BPM | HEIGHT: 72 IN | WEIGHT: 315 LBS | TEMPERATURE: 96.1 F

## 2025-08-22 DIAGNOSIS — R79.89 LOW TESTOSTERONE: ICD-10-CM

## 2025-08-22 DIAGNOSIS — R73.9 BLOOD GLUCOSE ELEVATED: Primary | ICD-10-CM

## 2025-08-22 DIAGNOSIS — R74.8 ELEVATED LIVER ENZYMES: ICD-10-CM

## 2025-08-22 PROCEDURE — 3079F DIAST BP 80-89 MM HG: CPT | Performed by: FAMILY MEDICINE

## 2025-08-22 PROCEDURE — 99214 OFFICE O/P EST MOD 30 MIN: CPT | Performed by: FAMILY MEDICINE

## 2025-08-22 PROCEDURE — 3074F SYST BP LT 130 MM HG: CPT | Performed by: FAMILY MEDICINE

## 2025-08-22 ASSESSMENT — FIBROSIS 4 INDEX: FIB4 SCORE: 1
